# Patient Record
Sex: FEMALE | Race: WHITE | NOT HISPANIC OR LATINO | Employment: PART TIME | URBAN - METROPOLITAN AREA
[De-identification: names, ages, dates, MRNs, and addresses within clinical notes are randomized per-mention and may not be internally consistent; named-entity substitution may affect disease eponyms.]

---

## 2019-03-07 ENCOUNTER — TRANSCRIBE ORDERS (OUTPATIENT)
Dept: LAB | Facility: HOSPITAL | Age: 29
End: 2019-03-07

## 2019-03-07 ENCOUNTER — LAB (OUTPATIENT)
Dept: LAB | Facility: HOSPITAL | Age: 29
End: 2019-03-07

## 2019-03-07 DIAGNOSIS — Z34.83 PRENATAL CARE, SUBSEQUENT PREGNANCY, THIRD TRIMESTER: Primary | ICD-10-CM

## 2019-03-07 DIAGNOSIS — Z34.83 PRENATAL CARE, SUBSEQUENT PREGNANCY, THIRD TRIMESTER: ICD-10-CM

## 2019-03-07 PROCEDURE — 87081 CULTURE SCREEN ONLY: CPT

## 2019-03-10 LAB — BACTERIA SPEC AEROBE CULT: NORMAL

## 2019-03-21 ENCOUNTER — APPOINTMENT (OUTPATIENT)
Dept: PREADMISSION TESTING | Facility: HOSPITAL | Age: 29
End: 2019-03-21

## 2019-03-21 VITALS — HEIGHT: 67 IN | BODY MASS INDEX: 24.84 KG/M2 | WEIGHT: 158.29 LBS

## 2019-03-21 LAB
ABO GROUP BLD: NORMAL
BLD GP AB SCN SERPL QL: NEGATIVE
DEPRECATED RDW RBC AUTO: 39.4 FL (ref 37–54)
ERYTHROCYTE [DISTWIDTH] IN BLOOD BY AUTOMATED COUNT: 12.3 % (ref 11.3–14.5)
HCT VFR BLD AUTO: 34.2 % (ref 34.5–44)
HGB BLD-MCNC: 11.4 G/DL (ref 11.5–15.5)
MCH RBC QN AUTO: 29.7 PG (ref 27–31)
MCHC RBC AUTO-ENTMCNC: 33.3 G/DL (ref 32–36)
MCV RBC AUTO: 89.1 FL (ref 80–99)
PLATELET # BLD AUTO: 229 10*3/MM3 (ref 150–450)
PMV BLD AUTO: 10.5 FL (ref 6–12)
RBC # BLD AUTO: 3.84 10*6/MM3 (ref 3.89–5.14)
RH BLD: POSITIVE
T&S EXPIRATION DATE: NORMAL
WBC NRBC COR # BLD: 7.69 10*3/MM3 (ref 3.5–10.8)

## 2019-03-21 PROCEDURE — 86850 RBC ANTIBODY SCREEN: CPT | Performed by: OBSTETRICS & GYNECOLOGY

## 2019-03-21 PROCEDURE — 36415 COLL VENOUS BLD VENIPUNCTURE: CPT

## 2019-03-21 PROCEDURE — 86900 BLOOD TYPING SEROLOGIC ABO: CPT | Performed by: OBSTETRICS & GYNECOLOGY

## 2019-03-21 PROCEDURE — 85027 COMPLETE CBC AUTOMATED: CPT | Performed by: OBSTETRICS & GYNECOLOGY

## 2019-03-21 PROCEDURE — 86901 BLOOD TYPING SEROLOGIC RH(D): CPT | Performed by: OBSTETRICS & GYNECOLOGY

## 2019-03-21 RX ORDER — DIPHENHYDRAMINE HCL 25 MG
25 CAPSULE ORAL NIGHTLY PRN
COMMUNITY
End: 2019-03-25 | Stop reason: HOSPADM

## 2019-03-21 RX ORDER — PRENATAL WITH FERROUS FUM AND FOLIC ACID 3080; 920; 120; 400; 22; 1.84; 3; 20; 10; 1; 12; 200; 27; 25; 2 [IU]/1; [IU]/1; MG/1; [IU]/1; MG/1; MG/1; MG/1; MG/1; MG/1; MG/1; UG/1; MG/1; MG/1; MG/1; MG/1
1 TABLET ORAL DAILY
COMMUNITY

## 2019-03-22 ENCOUNTER — ANESTHESIA EVENT (OUTPATIENT)
Dept: LABOR AND DELIVERY | Facility: HOSPITAL | Age: 29
End: 2019-03-22

## 2019-03-22 ENCOUNTER — ANESTHESIA (OUTPATIENT)
Dept: LABOR AND DELIVERY | Facility: HOSPITAL | Age: 29
End: 2019-03-22

## 2019-03-22 ENCOUNTER — HOSPITAL ENCOUNTER (INPATIENT)
Facility: HOSPITAL | Age: 29
LOS: 3 days | Discharge: HOME | End: 2019-03-25
Attending: OBSTETRICS & GYNECOLOGY | Admitting: OBSTETRICS & GYNECOLOGY

## 2019-03-22 LAB
ABO GROUP BLD: NORMAL
ARTERIAL PATENCY WRIST A: ABNORMAL
ATMOSPHERIC PRESS: ABNORMAL MMHG
ATMOSPHERIC PRESS: ABNORMAL MMHG
BASE EXCESS BLDA CALC-SCNC: -1.6 MMOL/L (ref 0–2)
BASE EXCESS BLDCOV CALC-SCNC: -1.3 MMOL/L (ref 0–2)
BDY SITE: ABNORMAL
BODY TEMPERATURE: 37 C
BODY TEMPERATURE: 37 C
CO2 BLDA-SCNC: 25.1 MMOL/L (ref 23–27)
CO2 BLDA-SCNC: 27.2 MMOL/L (ref 23–27)
COHGB MFR BLD: 1.6 % (ref 0–2)
GLUCOSE BLDC GLUCOMTR-MCNC: 72 MG/DL (ref 70–130)
HCO3 BLDA-SCNC: 25.6 MMOL/L (ref 20–26)
HCO3 BLDCOV-SCNC: 23.8 MMOL/L (ref 18.6–21.4)
HCT VFR BLD CALC: 47 %
HGB BLDA-MCNC: 15.3 G/DL (ref 14–18)
HGB BLDA-MCNC: 15.8 G/DL (ref 14–18)
HOROWITZ INDEX BLD+IHG-RTO: 21 %
HOROWITZ INDEX BLD+IHG-RTO: 21 %
METHGB BLD QL: 1.7 % (ref 0–1.5)
MODALITY: ABNORMAL
MODALITY: ABNORMAL
NOTE: ABNORMAL
NOTE: ABNORMAL
OXYHGB MFR BLDV: 29.2 % (ref 94–99)
PCO2 BLDA: 52 MM HG (ref 35–45)
PCO2 BLDCOV: 40.6 MM HG
PCO2 TEMP ADJ BLD: 52 MM HG (ref 35–45)
PH BLDA: 7.3 PH UNITS (ref 7.35–7.45)
PH BLDCOV: 7.38 PH UNITS
PH, TEMP CORRECTED: 7.3 PH UNITS
PO2 BLDA: 15.1 MM HG (ref 83–108)
PO2 BLDCOV: 28.6 MM HG
PO2 TEMP ADJ BLD: 15.1 MM HG (ref 83–108)
RH BLD: POSITIVE
SAO2 % BLDCOA: ABNORMAL % (ref 92–98)
VENTILATOR MODE: ABNORMAL
VENTILATOR MODE: ABNORMAL

## 2019-03-22 PROCEDURE — 86900 BLOOD TYPING SEROLOGIC ABO: CPT

## 2019-03-22 PROCEDURE — 25010000003 MORPHINE PER 10 MG: Performed by: NURSE ANESTHETIST, CERTIFIED REGISTERED

## 2019-03-22 PROCEDURE — 25010000002 FENTANYL CITRATE (PF) 100 MCG/2ML SOLUTION: Performed by: NURSE ANESTHETIST, CERTIFIED REGISTERED

## 2019-03-22 PROCEDURE — 82962 GLUCOSE BLOOD TEST: CPT

## 2019-03-22 PROCEDURE — 25010000002 ONDANSETRON PER 1 MG: Performed by: NURSE ANESTHETIST, CERTIFIED REGISTERED

## 2019-03-22 PROCEDURE — 36600 WITHDRAWAL OF ARTERIAL BLOOD: CPT

## 2019-03-22 PROCEDURE — 59025 FETAL NON-STRESS TEST: CPT

## 2019-03-22 PROCEDURE — 25010000002 PHENYLEPHRINE PER 1 ML: Performed by: NURSE ANESTHETIST, CERTIFIED REGISTERED

## 2019-03-22 PROCEDURE — 25010000003 CEFAZOLIN IN DEXTROSE 2-4 GM/100ML-% SOLUTION: Performed by: OBSTETRICS & GYNECOLOGY

## 2019-03-22 PROCEDURE — 94799 UNLISTED PULMONARY SVC/PX: CPT

## 2019-03-22 PROCEDURE — 25010000002 MIDAZOLAM PER 1 MG: Performed by: NURSE ANESTHETIST, CERTIFIED REGISTERED

## 2019-03-22 PROCEDURE — 25010000002 KETOROLAC TROMETHAMINE PER 15 MG: Performed by: NURSE ANESTHETIST, CERTIFIED REGISTERED

## 2019-03-22 PROCEDURE — 86901 BLOOD TYPING SEROLOGIC RH(D): CPT

## 2019-03-22 PROCEDURE — 82805 BLOOD GASES W/O2 SATURATION: CPT

## 2019-03-22 RX ORDER — IBUPROFEN 600 MG/1
600 TABLET ORAL EVERY 6 HOURS PRN
Status: DISCONTINUED | OUTPATIENT
Start: 2019-03-22 | End: 2019-03-25 | Stop reason: HOSPADM

## 2019-03-22 RX ORDER — CARBOPROST TROMETHAMINE 250 UG/ML
250 INJECTION, SOLUTION INTRAMUSCULAR AS NEEDED
Status: DISCONTINUED | OUTPATIENT
Start: 2019-03-22 | End: 2019-03-25 | Stop reason: HOSPADM

## 2019-03-22 RX ORDER — TRISODIUM CITRATE DIHYDRATE AND CITRIC ACID MONOHYDRATE 500; 334 MG/5ML; MG/5ML
30 SOLUTION ORAL ONCE
Status: COMPLETED | OUTPATIENT
Start: 2019-03-22 | End: 2019-03-22

## 2019-03-22 RX ORDER — LIDOCAINE HYDROCHLORIDE 10 MG/ML
5 INJECTION, SOLUTION EPIDURAL; INFILTRATION; INTRACAUDAL; PERINEURAL AS NEEDED
Status: DISCONTINUED | OUTPATIENT
Start: 2019-03-22 | End: 2019-03-25 | Stop reason: HOSPADM

## 2019-03-22 RX ORDER — SODIUM CHLORIDE, SODIUM LACTATE, POTASSIUM CHLORIDE, CALCIUM CHLORIDE 600; 310; 30; 20 MG/100ML; MG/100ML; MG/100ML; MG/100ML
125 INJECTION, SOLUTION INTRAVENOUS CONTINUOUS
Status: DISCONTINUED | OUTPATIENT
Start: 2019-03-22 | End: 2019-03-25 | Stop reason: HOSPADM

## 2019-03-22 RX ORDER — OXYCODONE AND ACETAMINOPHEN 7.5; 325 MG/1; MG/1
1 TABLET ORAL EVERY 4 HOURS PRN
Status: COMPLETED | OUTPATIENT
Start: 2019-03-22 | End: 2019-03-22

## 2019-03-22 RX ORDER — MISOPROSTOL 200 UG/1
800 TABLET ORAL AS NEEDED
Status: DISCONTINUED | OUTPATIENT
Start: 2019-03-22 | End: 2019-03-25 | Stop reason: HOSPADM

## 2019-03-22 RX ORDER — METHYLERGONOVINE MALEATE 0.2 MG/ML
200 INJECTION INTRAVENOUS ONCE AS NEEDED
Status: DISCONTINUED | OUTPATIENT
Start: 2019-03-22 | End: 2019-03-25 | Stop reason: HOSPADM

## 2019-03-22 RX ORDER — MORPHINE SULFATE 0.5 MG/ML
INJECTION, SOLUTION EPIDURAL; INTRATHECAL; INTRAVENOUS AS NEEDED
Status: DISCONTINUED | OUTPATIENT
Start: 2019-03-22 | End: 2019-03-22 | Stop reason: SURG

## 2019-03-22 RX ORDER — OXYTOCIN-SODIUM CHLORIDE 0.9% IV SOLN 30 UNIT/500ML 30-0.9/5 UT/ML-%
85 SOLUTION INTRAVENOUS ONCE
Status: DISCONTINUED | OUTPATIENT
Start: 2019-03-22 | End: 2019-03-25 | Stop reason: HOSPADM

## 2019-03-22 RX ORDER — PROMETHAZINE HYDROCHLORIDE 25 MG/ML
12.5 INJECTION, SOLUTION INTRAMUSCULAR; INTRAVENOUS EVERY 6 HOURS PRN
Status: DISCONTINUED | OUTPATIENT
Start: 2019-03-22 | End: 2019-03-25 | Stop reason: HOSPADM

## 2019-03-22 RX ORDER — DIPHENHYDRAMINE HCL 25 MG
25 CAPSULE ORAL EVERY 4 HOURS PRN
Status: CANCELLED | OUTPATIENT
Start: 2019-03-22

## 2019-03-22 RX ORDER — ACETAMINOPHEN 325 MG/1
650 TABLET ORAL EVERY 4 HOURS PRN
Status: DISCONTINUED | OUTPATIENT
Start: 2019-03-22 | End: 2019-03-25 | Stop reason: HOSPADM

## 2019-03-22 RX ORDER — LANOLIN 100 %
OINTMENT (GRAM) TOPICAL
Status: DISCONTINUED | OUTPATIENT
Start: 2019-03-22 | End: 2019-03-25 | Stop reason: HOSPADM

## 2019-03-22 RX ORDER — FENTANYL CITRATE 50 UG/ML
INJECTION, SOLUTION INTRAMUSCULAR; INTRAVENOUS AS NEEDED
Status: DISCONTINUED | OUTPATIENT
Start: 2019-03-22 | End: 2019-03-22 | Stop reason: SURG

## 2019-03-22 RX ORDER — SIMETHICONE 80 MG
80 TABLET,CHEWABLE ORAL 4 TIMES DAILY PRN
Status: DISCONTINUED | OUTPATIENT
Start: 2019-03-22 | End: 2019-03-25 | Stop reason: HOSPADM

## 2019-03-22 RX ORDER — IBUPROFEN 600 MG/1
600 TABLET ORAL EVERY 6 HOURS PRN
Status: COMPLETED | OUTPATIENT
Start: 2019-03-22 | End: 2019-03-22

## 2019-03-22 RX ORDER — PROMETHAZINE HYDROCHLORIDE 25 MG/1
25 TABLET ORAL EVERY 6 HOURS PRN
Status: DISCONTINUED | OUTPATIENT
Start: 2019-03-22 | End: 2019-03-25 | Stop reason: HOSPADM

## 2019-03-22 RX ORDER — NALOXONE HCL 0.4 MG/ML
0.4 VIAL (ML) INJECTION
Status: DISCONTINUED | OUTPATIENT
Start: 2019-03-22 | End: 2019-03-25 | Stop reason: HOSPADM

## 2019-03-22 RX ORDER — SODIUM CHLORIDE 0.9 % (FLUSH) 0.9 %
3 SYRINGE (ML) INJECTION EVERY 12 HOURS SCHEDULED
Status: DISCONTINUED | OUTPATIENT
Start: 2019-03-22 | End: 2019-03-23

## 2019-03-22 RX ORDER — ONDANSETRON 2 MG/ML
4 INJECTION INTRAMUSCULAR; INTRAVENOUS ONCE AS NEEDED
Status: COMPLETED | OUTPATIENT
Start: 2019-03-22 | End: 2019-03-22

## 2019-03-22 RX ORDER — CARBOPROST TROMETHAMINE 250 UG/ML
250 INJECTION, SOLUTION INTRAMUSCULAR ONCE
Status: DISCONTINUED | OUTPATIENT
Start: 2019-03-22 | End: 2019-03-22

## 2019-03-22 RX ORDER — MIDAZOLAM HYDROCHLORIDE 1 MG/ML
INJECTION INTRAMUSCULAR; INTRAVENOUS AS NEEDED
Status: DISCONTINUED | OUTPATIENT
Start: 2019-03-22 | End: 2019-03-22 | Stop reason: SURG

## 2019-03-22 RX ORDER — SODIUM CHLORIDE 0.9 % (FLUSH) 0.9 %
3-10 SYRINGE (ML) INJECTION AS NEEDED
Status: DISCONTINUED | OUTPATIENT
Start: 2019-03-22 | End: 2019-03-23

## 2019-03-22 RX ORDER — PROMETHAZINE HYDROCHLORIDE 12.5 MG/1
12.5 SUPPOSITORY RECTAL EVERY 6 HOURS PRN
Status: DISCONTINUED | OUTPATIENT
Start: 2019-03-22 | End: 2019-03-25 | Stop reason: HOSPADM

## 2019-03-22 RX ORDER — OXYCODONE HYDROCHLORIDE AND ACETAMINOPHEN 5; 325 MG/1; MG/1
2 TABLET ORAL EVERY 4 HOURS PRN
Status: DISCONTINUED | OUTPATIENT
Start: 2019-03-22 | End: 2019-03-25 | Stop reason: HOSPADM

## 2019-03-22 RX ORDER — NALOXONE HCL 0.4 MG/ML
0.4 VIAL (ML) INJECTION
Status: CANCELLED | OUTPATIENT
Start: 2019-03-22 | End: 2019-03-23

## 2019-03-22 RX ORDER — METHYLERGONOVINE MALEATE 0.2 MG/ML
200 INJECTION INTRAVENOUS ONCE
Status: DISCONTINUED | OUTPATIENT
Start: 2019-03-22 | End: 2019-03-22

## 2019-03-22 RX ORDER — OXYTOCIN-SODIUM CHLORIDE 0.9% IV SOLN 30 UNIT/500ML 30-0.9/5 UT/ML-%
650 SOLUTION INTRAVENOUS ONCE
Status: DISCONTINUED | OUTPATIENT
Start: 2019-03-22 | End: 2019-03-25 | Stop reason: HOSPADM

## 2019-03-22 RX ORDER — MORPHINE SULFATE 2 MG/ML
2 INJECTION, SOLUTION INTRAMUSCULAR; INTRAVENOUS EVERY 4 HOURS PRN
Status: DISCONTINUED | OUTPATIENT
Start: 2019-03-22 | End: 2019-03-25 | Stop reason: HOSPADM

## 2019-03-22 RX ORDER — DOCUSATE SODIUM 100 MG/1
100 CAPSULE, LIQUID FILLED ORAL 2 TIMES DAILY PRN
Status: DISCONTINUED | OUTPATIENT
Start: 2019-03-22 | End: 2019-03-25 | Stop reason: HOSPADM

## 2019-03-22 RX ORDER — CEFAZOLIN SODIUM 2 G/100ML
2 INJECTION, SOLUTION INTRAVENOUS ONCE
Status: COMPLETED | OUTPATIENT
Start: 2019-03-22 | End: 2019-03-22

## 2019-03-22 RX ORDER — DIPHENHYDRAMINE HYDROCHLORIDE 50 MG/ML
25 INJECTION INTRAMUSCULAR; INTRAVENOUS EVERY 4 HOURS PRN
Status: CANCELLED | OUTPATIENT
Start: 2019-03-22

## 2019-03-22 RX ORDER — ONDANSETRON 2 MG/ML
INJECTION INTRAMUSCULAR; INTRAVENOUS AS NEEDED
Status: DISCONTINUED | OUTPATIENT
Start: 2019-03-22 | End: 2019-03-22 | Stop reason: SURG

## 2019-03-22 RX ORDER — OXYTOCIN 10 [USP'U]/ML
INJECTION, SOLUTION INTRAMUSCULAR; INTRAVENOUS AS NEEDED
Status: DISCONTINUED | OUTPATIENT
Start: 2019-03-22 | End: 2019-03-22 | Stop reason: SURG

## 2019-03-22 RX ORDER — KETOROLAC TROMETHAMINE 30 MG/ML
30 INJECTION, SOLUTION INTRAMUSCULAR; INTRAVENOUS EVERY 6 HOURS
Status: COMPLETED | OUTPATIENT
Start: 2019-03-22 | End: 2019-03-22

## 2019-03-22 RX ORDER — BUPIVACAINE HYDROCHLORIDE 7.5 MG/ML
INJECTION, SOLUTION EPIDURAL; RETROBULBAR AS NEEDED
Status: DISCONTINUED | OUTPATIENT
Start: 2019-03-22 | End: 2019-03-22 | Stop reason: SURG

## 2019-03-22 RX ADMIN — MIDAZOLAM HYDROCHLORIDE 1 MG: 1 INJECTION, SOLUTION INTRAMUSCULAR; INTRAVENOUS at 07:49

## 2019-03-22 RX ADMIN — FENTANYL CITRATE 15 MCG: 50 INJECTION, SOLUTION INTRAMUSCULAR; INTRAVENOUS at 07:54

## 2019-03-22 RX ADMIN — POLYETHYLENE GLYCOL 3350 17 G: 17 POWDER, FOR SOLUTION ORAL at 17:12

## 2019-03-22 RX ADMIN — CEFAZOLIN SODIUM 2 G: 2 INJECTION, SOLUTION INTRAVENOUS at 07:41

## 2019-03-22 RX ADMIN — ONDANSETRON 4 MG: 2 INJECTION INTRAMUSCULAR; INTRAVENOUS at 07:57

## 2019-03-22 RX ADMIN — SODIUM CITRATE AND CITRIC ACID MONOHYDRATE 30 ML: 500; 334 SOLUTION ORAL at 07:35

## 2019-03-22 RX ADMIN — OXYTOCIN 20 UNITS: 10 INJECTION, SOLUTION INTRAMUSCULAR; INTRAVENOUS at 08:31

## 2019-03-22 RX ADMIN — OXYCODONE HYDROCHLORIDE AND ACETAMINOPHEN 1 TABLET: 7.5; 325 TABLET ORAL at 11:54

## 2019-03-22 RX ADMIN — IBUPROFEN 600 MG: 600 TABLET, FILM COATED ORAL at 17:19

## 2019-03-22 RX ADMIN — KETOROLAC TROMETHAMINE 30 MG: 30 INJECTION, SOLUTION INTRAMUSCULAR at 10:26

## 2019-03-22 RX ADMIN — MORPHINE SULFATE 0.15 MG: 0.5 INJECTION, SOLUTION EPIDURAL; INTRATHECAL; INTRAVENOUS at 07:54

## 2019-03-22 RX ADMIN — SIMETHICONE CHEW TAB 80 MG 80 MG: 80 TABLET ORAL at 23:07

## 2019-03-22 RX ADMIN — IBUPROFEN 600 MG: 600 TABLET, FILM COATED ORAL at 22:15

## 2019-03-22 RX ADMIN — BUPIVACAINE HYDROCHLORIDE 1.5 ML: 7.5 INJECTION, SOLUTION EPIDURAL; RETROBULBAR at 07:54

## 2019-03-22 RX ADMIN — PHENYLEPHRINE HYDROCHLORIDE 50 MCG: 10 INJECTION INTRAVENOUS at 08:00

## 2019-03-22 RX ADMIN — FENTANYL CITRATE 85 MCG: 50 INJECTION, SOLUTION INTRAMUSCULAR; INTRAVENOUS at 08:15

## 2019-03-22 RX ADMIN — OXYTOCIN 30 UNITS: 10 INJECTION, SOLUTION INTRAMUSCULAR; INTRAVENOUS at 08:12

## 2019-03-22 RX ADMIN — SODIUM CHLORIDE, POTASSIUM CHLORIDE, SODIUM LACTATE AND CALCIUM CHLORIDE 1000 ML: 600; 310; 30; 20 INJECTION, SOLUTION INTRAVENOUS at 07:28

## 2019-03-22 RX ADMIN — OXYCODONE AND ACETAMINOPHEN 2 TABLET: 5; 325 TABLET ORAL at 22:16

## 2019-03-22 RX ADMIN — SIMETHICONE CHEW TAB 80 MG 80 MG: 80 TABLET ORAL at 18:01

## 2019-03-22 RX ADMIN — MIDAZOLAM HYDROCHLORIDE 1 MG: 1 INJECTION, SOLUTION INTRAMUSCULAR; INTRAVENOUS at 08:24

## 2019-03-22 RX ADMIN — OXYCODONE AND ACETAMINOPHEN 2 TABLET: 5; 325 TABLET ORAL at 18:08

## 2019-03-22 RX ADMIN — SODIUM CHLORIDE, POTASSIUM CHLORIDE, SODIUM LACTATE AND CALCIUM CHLORIDE 1000 ML/HR: 600; 310; 30; 20 INJECTION, SOLUTION INTRAVENOUS at 07:30

## 2019-03-22 RX ADMIN — SODIUM CHLORIDE, POTASSIUM CHLORIDE, SODIUM LACTATE AND CALCIUM CHLORIDE: 600; 310; 30; 20 INJECTION, SOLUTION INTRAVENOUS at 08:31

## 2019-03-22 RX ADMIN — SODIUM CHLORIDE, POTASSIUM CHLORIDE, SODIUM LACTATE AND CALCIUM CHLORIDE: 600; 310; 30; 20 INJECTION, SOLUTION INTRAVENOUS at 08:03

## 2019-03-22 RX ADMIN — ONDANSETRON 4 MG: 2 INJECTION INTRAMUSCULAR; INTRAVENOUS at 13:19

## 2019-03-22 NOTE — ANESTHESIA PREPROCEDURE EVALUATION
Anesthesia Evaluation     Patient summary reviewed and Nursing notes reviewed   NPO Solid Status: > 8 hours  NPO Liquid Status: > 8 hours           Airway   Mallampati: II  TM distance: >3 FB  Neck ROM: full  Dental      Pulmonary - negative pulmonary ROS   Cardiovascular - negative cardio ROS        Neuro/Psych- negative ROS  GI/Hepatic/Renal/Endo - negative ROS     Musculoskeletal (-) negative ROS    Abdominal    Substance History - negative use     OB/GYN    (+) Pregnant,         Other                        Anesthesia Plan    ASA 2     spinal and ITN     Anesthetic plan, all risks, benefits, and alternatives have been provided, discussed and informed consent has been obtained with: patient.

## 2019-03-22 NOTE — ANESTHESIA POSTPROCEDURE EVALUATION
Patient: Suzanne Wade    Procedure Summary     Date:  19 Room / Location:  [facility] LABOR DELIVERY   [facility]    Anesthesia Start:  746 Anesthesia Stop:  847    Procedure:   SECTION PRIMARY (N/A Abdomen) Diagnosis:      Surgeon:  Macey Hayden MD Provider:  Kei Lang DO    Anesthesia Type:  spinal, ITN ASA Status:  2          Anesthesia Type: spinal, ITN  Last vitals  BP   107/64 (19 0845)   Temp   97.8 °F (36.6 °C) (19)   Pulse   68 (19)   Resp   18 (19)     SpO2   97 % (19)     Post Anesthesia Care and Evaluation    Patient location during evaluation: bedside  Patient participation: complete - patient participated  Level of consciousness: awake and alert  Pain score: 0  Pain management: adequate  Airway patency: patent  Anesthetic complications: No anesthetic complications    Cardiovascular status: acceptable  Respiratory status: acceptable  Hydration status: acceptable

## 2019-03-22 NOTE — ANESTHESIA PROCEDURE NOTES
Spinal Block      Indication:procedure for pain  Performed By  Anesthesiologist: Kei Lang DO  CRNA: Liliane Licona CRNA  Preanesthetic Checklist  Completed: patient identified, surgical consent, pre-op evaluation, timeout performed, IV checked, risks and benefits discussed and monitors and equipment checked  Spinal Block Prep:  Patient Position:sitting  Sterile Tech:cap, gloves, mask and sterile barriers  Prep:Betadine  Patient Monitoring:blood pressure monitoring, continuous pulse oximetry and EKG  Spinal Block Procedure    Fluid Appearance:clear     Post Assessment  Patient Tolerance:patient tolerated the procedure well with no apparent complications  Complications no

## 2019-03-23 LAB
BASOPHILS # BLD AUTO: 0.02 10*3/MM3 (ref 0–0.2)
BASOPHILS NFR BLD AUTO: 0.3 % (ref 0–1)
DEPRECATED RDW RBC AUTO: 39.5 FL (ref 37–54)
EOSINOPHIL # BLD AUTO: 0.06 10*3/MM3 (ref 0–0.3)
EOSINOPHIL NFR BLD AUTO: 0.8 % (ref 0–3)
ERYTHROCYTE [DISTWIDTH] IN BLOOD BY AUTOMATED COUNT: 12.2 % (ref 11.3–14.5)
HCT VFR BLD AUTO: 27.6 % (ref 34.5–44)
HGB BLD-MCNC: 9.2 G/DL (ref 11.5–15.5)
IMM GRANULOCYTES # BLD AUTO: 0.01 10*3/MM3 (ref 0–0.05)
IMM GRANULOCYTES NFR BLD AUTO: 0.1 % (ref 0–0.6)
LYMPHOCYTES # BLD AUTO: 1.21 10*3/MM3 (ref 0.6–4.8)
LYMPHOCYTES NFR BLD AUTO: 15.2 % (ref 24–44)
MCH RBC QN AUTO: 29.6 PG (ref 27–31)
MCHC RBC AUTO-ENTMCNC: 33.3 G/DL (ref 32–36)
MCV RBC AUTO: 88.7 FL (ref 80–99)
MONOCYTES # BLD AUTO: 0.65 10*3/MM3 (ref 0–1)
MONOCYTES NFR BLD AUTO: 8.1 % (ref 0–12)
NEUTROPHILS # BLD AUTO: 6.04 10*3/MM3 (ref 1.5–8.3)
NEUTROPHILS NFR BLD AUTO: 75.6 % (ref 41–71)
PLATELET # BLD AUTO: 178 10*3/MM3 (ref 150–450)
PMV BLD AUTO: 10.5 FL (ref 6–12)
RBC # BLD AUTO: 3.11 10*6/MM3 (ref 3.89–5.14)
WBC NRBC COR # BLD: 7.98 10*3/MM3 (ref 3.5–10.8)

## 2019-03-23 PROCEDURE — 85025 COMPLETE CBC W/AUTO DIFF WBC: CPT | Performed by: OBSTETRICS & GYNECOLOGY

## 2019-03-23 RX ADMIN — IBUPROFEN 600 MG: 600 TABLET, FILM COATED ORAL at 19:15

## 2019-03-23 RX ADMIN — OXYCODONE AND ACETAMINOPHEN 2 TABLET: 5; 325 TABLET ORAL at 12:20

## 2019-03-23 RX ADMIN — SIMETHICONE CHEW TAB 80 MG 80 MG: 80 TABLET ORAL at 07:42

## 2019-03-23 RX ADMIN — IBUPROFEN 600 MG: 600 TABLET, FILM COATED ORAL at 06:09

## 2019-03-23 RX ADMIN — OXYCODONE AND ACETAMINOPHEN 2 TABLET: 5; 325 TABLET ORAL at 06:09

## 2019-03-23 RX ADMIN — DOCUSATE SODIUM 100 MG: 100 CAPSULE, LIQUID FILLED ORAL at 07:42

## 2019-03-23 RX ADMIN — IBUPROFEN 600 MG: 600 TABLET, FILM COATED ORAL at 12:21

## 2019-03-23 RX ADMIN — OXYCODONE AND ACETAMINOPHEN 2 TABLET: 5; 325 TABLET ORAL at 19:15

## 2019-03-23 NOTE — ANESTHESIA POSTPROCEDURE EVALUATION
Patient: Suzanne Wade    Procedure Summary     Date:  19 Room / Location:  [facility] LABOR DELIVERY   [facility]    Anesthesia Start:  746 Anesthesia Stop:  847    Procedure:   SECTION PRIMARY (N/A Abdomen) Diagnosis:      Surgeon:  Macey Hayden MD Provider:  Kei Lang DO    Anesthesia Type:  spinal, ITN ASA Status:  2          Anesthesia Type: spinal, ITN  Last vitals  BP   113/67 (19 0710)   Temp   97.6 °F (36.4 °C) (19 0710)   Pulse   50 (19 0710)   Resp   16 (19 0710)     SpO2   100 % (19 1031)     Post Anesthesia Care and Evaluation    Patient location during evaluation: bedside  Patient participation: complete - patient participated  Level of consciousness: awake and awake and alert  Pain score: 0  Pain management: satisfactory to patient  Airway patency: patent  Anesthetic complications: No anesthetic complications  PONV Status: none  Cardiovascular status: acceptable  Respiratory status: acceptable  Hydration status: acceptable  Post Neuraxial Block status: Motor and sensory function returned to baseline and No signs or symptoms of PDPH

## 2019-03-24 RX ADMIN — SIMETHICONE CHEW TAB 80 MG 80 MG: 80 TABLET ORAL at 08:09

## 2019-03-24 RX ADMIN — DOCUSATE SODIUM 100 MG: 100 CAPSULE, LIQUID FILLED ORAL at 08:09

## 2019-03-24 RX ADMIN — OXYCODONE AND ACETAMINOPHEN 2 TABLET: 5; 325 TABLET ORAL at 10:23

## 2019-03-24 RX ADMIN — IBUPROFEN 600 MG: 600 TABLET, FILM COATED ORAL at 16:38

## 2019-03-24 RX ADMIN — IBUPROFEN 600 MG: 600 TABLET, FILM COATED ORAL at 10:23

## 2019-03-24 RX ADMIN — OXYCODONE AND ACETAMINOPHEN 2 TABLET: 5; 325 TABLET ORAL at 16:38

## 2019-03-24 RX ADMIN — SIMETHICONE CHEW TAB 80 MG 80 MG: 80 TABLET ORAL at 16:38

## 2019-03-24 RX ADMIN — OXYCODONE AND ACETAMINOPHEN 2 TABLET: 5; 325 TABLET ORAL at 04:22

## 2019-03-24 RX ADMIN — IBUPROFEN 600 MG: 600 TABLET, FILM COATED ORAL at 04:22

## 2019-03-25 VITALS
HEART RATE: 62 BPM | OXYGEN SATURATION: 100 % | BODY MASS INDEX: 24.64 KG/M2 | DIASTOLIC BLOOD PRESSURE: 76 MMHG | TEMPERATURE: 98.4 F | HEIGHT: 67 IN | WEIGHT: 157 LBS | SYSTOLIC BLOOD PRESSURE: 127 MMHG | RESPIRATION RATE: 16 BRPM

## 2019-03-25 RX ORDER — IBUPROFEN 600 MG/1
600 TABLET ORAL EVERY 6 HOURS PRN
Qty: 30 TABLET | Refills: 0 | Status: SHIPPED | OUTPATIENT
Start: 2019-03-25 | End: 2022-06-14

## 2019-03-25 RX ORDER — PSEUDOEPHEDRINE HCL 30 MG
100 TABLET ORAL 2 TIMES DAILY PRN
Start: 2019-03-25 | End: 2022-07-12

## 2019-03-25 RX ORDER — OXYCODONE HYDROCHLORIDE AND ACETAMINOPHEN 5; 325 MG/1; MG/1
1-2 TABLET ORAL EVERY 6 HOURS PRN
Qty: 15 TABLET | Refills: 0 | Status: SHIPPED | OUTPATIENT
Start: 2019-03-25 | End: 2019-03-28

## 2019-03-25 RX ADMIN — IBUPROFEN 600 MG: 600 TABLET, FILM COATED ORAL at 09:10

## 2019-03-25 RX ADMIN — OXYCODONE AND ACETAMINOPHEN 1 TABLET: 5; 325 TABLET ORAL at 09:10

## 2019-03-25 RX ADMIN — OXYCODONE AND ACETAMINOPHEN 1 TABLET: 5; 325 TABLET ORAL at 00:41

## 2019-03-25 RX ADMIN — POLYETHYLENE GLYCOL 3350 17 G: 17 POWDER, FOR SOLUTION ORAL at 09:10

## 2019-03-25 RX ADMIN — OXYCODONE AND ACETAMINOPHEN 1 TABLET: 5; 325 TABLET ORAL at 14:22

## 2019-03-25 RX ADMIN — SIMETHICONE CHEW TAB 80 MG 80 MG: 80 TABLET ORAL at 09:10

## 2019-03-25 RX ADMIN — IBUPROFEN 600 MG: 600 TABLET, FILM COATED ORAL at 00:41

## 2019-03-25 RX ADMIN — DOCUSATE SODIUM 100 MG: 100 CAPSULE, LIQUID FILLED ORAL at 09:10

## 2022-05-03 ENCOUNTER — OFFICE VISIT (OUTPATIENT)
Dept: OBSTETRICS AND GYNECOLOGY | Age: 32
End: 2022-05-03

## 2022-05-03 VITALS
WEIGHT: 132 LBS | DIASTOLIC BLOOD PRESSURE: 70 MMHG | HEIGHT: 67 IN | SYSTOLIC BLOOD PRESSURE: 120 MMHG | BODY MASS INDEX: 20.72 KG/M2

## 2022-05-03 DIAGNOSIS — N92.6 MISSED MENSES: Primary | ICD-10-CM

## 2022-05-03 LAB
B-HCG UR QL: POSITIVE
EXPIRATION DATE: ABNORMAL
INTERNAL NEGATIVE CONTROL: ABNORMAL
INTERNAL POSITIVE CONTROL: ABNORMAL
Lab: ABNORMAL

## 2022-05-03 PROCEDURE — 99202 OFFICE O/P NEW SF 15 MIN: CPT | Performed by: OBSTETRICS & GYNECOLOGY

## 2022-05-03 PROCEDURE — 81025 URINE PREGNANCY TEST: CPT | Performed by: OBSTETRICS & GYNECOLOGY

## 2022-05-03 RX ORDER — MELATONIN
1000 DAILY
COMMUNITY

## 2022-05-03 NOTE — PROGRESS NOTES
Subjective       History of Present Illness  Suzanne Wade is a 32 y.o. female is being seen today for missed menses    Patient was originally scheduled for discussion of conception concerns but recently had a positive pregnancy test    She has previously had a  for breech presentation in [facility] in  that pregnancy had no other complications and she did well postoperatively  She had an early miscarriage around 6 weeks last year.  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    No significant family history for genetic issues.    Her last menstrual period of  was pretty regular  Chief Complaint   Patient presents with   • Gynecologic Exam     New gyn: previously trying for pregnancy x 1 yr.Positive pregnancy test 22,Hx: 2021   .        The following portions of the patient's history were reviewed and updated as appropriate: allergies, current medications, past family history, past medical history, past social history, past surgical history and problem list.    PAST MEDICAL HISTORY  No past medical history on file.  OB History    Para Term  AB Living   3 1 1   1 1   SAB IAB Ectopic Molar Multiple Live Births   1       0 1      # Outcome Date GA Lbr Gabe/2nd Weight Sex Delivery Anes PTL Lv   3 Current            2 SAB     U SAB   FD   1 Term 19 39w0d  3145 g (6 lb 14.9 oz) F CS-LTranv Spinal N RAYMUNDO     Past Surgical History:   Procedure Laterality Date   •  SECTION N/A 3/22/2019    Procedure:  SECTION PRIMARY;  Surgeon: Macey Hayden MD;  Location: [facility] LABOR DELIVERY;  Service: Obstetrics/Gynecology   • SKIN SURGERY      birth escobar removed    • WISDOM TOOTH EXTRACTION       No family history on file.  Social History     Tobacco Use   Smoking Status Never Smoker   Smokeless Tobacco Never Used       Current Outpatient  Medications:   •  cholecalciferol (VITAMIN D3) 25 MCG (1000 UT) tablet, Take 1,000 Units by mouth Daily., Disp: , Rfl:   •  ibuprofen (ADVIL,MOTRIN) 600 MG tablet, Take 1 tablet by mouth Every 6 (Six) Hours As Needed for Mild Pain ., Disp: 30 tablet, Rfl: 0  •  Prenatal Vit-Fe Fumarate-FA (PRENATAL 27-) 27-1 MG tablet tablet, Take 1 tablet by mouth Daily., Disp: , Rfl:   •  docusate sodium 100 MG capsule, Take 100 mg by mouth 2 (Two) Times a Day As Needed for Constipation., Disp: , Rfl:   •  meclizine (ANTIVERT) 25 MG tablet, 1 pill TID prn dizziness, Disp: 21 tablet, Rfl: 0  There is no immunization history for the selected administration types on file for this patient.    Review of Systems       Except as outlined in history of physical illness, patient denies any changes in her GYN, , GI systems. All other systems reviewed are negative.    Objective   Physical Exam   Alert and oriented, respirations unlabored, heart regular rate and rhythm   Pelvic external genitalia normal vagina clean dry intact cervix is nulliparous in appearance uterus is enlarged 5 and half to 6 weeks size slightly retroflexed adnexa nonenlarged non tender  Heart regular rate and rhythm  Lungs are clear  Breast are even and symmetrical no masses or discharge        Assessment/Plan   Diagnoses and all orders for this visit:    1. Missed menses (Primary)  -     POC Pregnancy, Urine  -     OB Panel With HIV  -     Progesterone  -     TSH  -     HCG, B-subunit, Quantitative  -     IGP, Apt HPV,rfx 16 / 18,45      History of previous     History of SAB at 6 weeks as outlined above    Handheld ultrasound could not see a gestational sac, will check new OB lab work including TSH hCG and progesterone  Return in 1 to 2 weeks for ultrasound for confirmation of pregnancy  Risk of miscarriage reviewed continue prenatal vitamins new OB information given to patient           Orders Placed This Encounter   Procedures   • OB Panel With HIV      Order Specific Question:   Release to patient     Answer:   Immediate   • Progesterone     Order Specific Question:   Release to patient     Answer:   Immediate   • TSH     Order Specific Question:   Release to patient     Answer:   Immediate   • HCG, B-subunit, Quantitative     Order Specific Question:   Release to patient     Answer:   Immediate   • POC Pregnancy, Urine     Order Specific Question:   Release to patient     Answer:   Immediate           EMR Dragon/ Transcription disclaimer:  Much of the encounter note is an electronic transcription/translation of spoken language to printed text. The electronic translation of spoken language may permit erroneous, or at times, nonessential words or phrases to be inadvertently transcribes; Although i have reviewed the note for such errors, some may still exist.

## 2022-05-04 ENCOUNTER — TELEPHONE (OUTPATIENT)
Dept: OBSTETRICS AND GYNECOLOGY | Age: 32
End: 2022-05-04

## 2022-05-04 LAB
ABO GROUP BLD: NORMAL
BASOPHILS # BLD AUTO: 0.1 X10E3/UL (ref 0–0.2)
BASOPHILS NFR BLD AUTO: 1 %
BLD GP AB SCN SERPL QL: NEGATIVE
EOSINOPHIL # BLD AUTO: 0.4 X10E3/UL (ref 0–0.4)
EOSINOPHIL NFR BLD AUTO: 6 %
ERYTHROCYTE [DISTWIDTH] IN BLOOD BY AUTOMATED COUNT: 11.9 % (ref 11.7–15.4)
HBV SURFACE AG SERPL QL IA: NEGATIVE
HCG INTACT+B SERPL-ACNC: NORMAL MIU/ML
HCT VFR BLD AUTO: 38.7 % (ref 34–46.6)
HCV AB S/CO SERPL IA: <0.1 S/CO RATIO (ref 0–0.9)
HGB BLD-MCNC: 12.7 G/DL (ref 11.1–15.9)
HIV 1+2 AB+HIV1 P24 AG SERPL QL IA: NON REACTIVE
IMM GRANULOCYTES # BLD AUTO: 0 X10E3/UL (ref 0–0.1)
IMM GRANULOCYTES NFR BLD AUTO: 0 %
LYMPHOCYTES # BLD AUTO: 1.5 X10E3/UL (ref 0.7–3.1)
LYMPHOCYTES NFR BLD AUTO: 19 %
MCH RBC QN AUTO: 29.7 PG (ref 26.6–33)
MCHC RBC AUTO-ENTMCNC: 32.8 G/DL (ref 31.5–35.7)
MCV RBC AUTO: 91 FL (ref 79–97)
MONOCYTES # BLD AUTO: 0.4 X10E3/UL (ref 0.1–0.9)
MONOCYTES NFR BLD AUTO: 6 %
NEUTROPHILS # BLD AUTO: 5.5 X10E3/UL (ref 1.4–7)
NEUTROPHILS NFR BLD AUTO: 68 %
PLATELET # BLD AUTO: 267 X10E3/UL (ref 150–450)
PROGEST SERPL-MCNC: 32.6 NG/ML
RBC # BLD AUTO: 4.27 X10E6/UL (ref 3.77–5.28)
RH BLD: POSITIVE
RPR SER QL: NON REACTIVE
RUBV IGG SERPL IA-ACNC: 1.07 INDEX
TSH SERPL DL<=0.005 MIU/L-ACNC: 2.26 UIU/ML (ref 0.45–4.5)
WBC # BLD AUTO: 8 X10E3/UL (ref 3.4–10.8)

## 2022-05-04 NOTE — TELEPHONE ENCOUNTER
----- Message from Jhon Palacios MD sent at 5/4/2022  9:23 AM EDT -----   Please notify pt. That labs are with in normal limits, hormone levels look excellent.

## 2022-05-05 ENCOUNTER — PATIENT ROUNDING (BHMG ONLY) (OUTPATIENT)
Dept: OBSTETRICS AND GYNECOLOGY | Age: 32
End: 2022-05-05

## 2022-05-05 NOTE — PROGRESS NOTES
A MY CHART MESSAGE HAS BEEN SENT TO THE PATIENT FOR [facility].

## 2022-05-09 LAB
CYTOLOGIST CVX/VAG CYTO: NORMAL
CYTOLOGY CVX/VAG DOC CYTO: NORMAL
CYTOLOGY CVX/VAG DOC THIN PREP: NORMAL
DX ICD CODE: NORMAL
HIV 1 & 2 AB SER-IMP: NORMAL
HPV I/H RISK 4 DNA CVX QL PROBE+SIG AMP: NEGATIVE
OTHER STN SPEC: NORMAL
STAT OF ADQ CVX/VAG CYTO-IMP: NORMAL

## 2022-05-17 ENCOUNTER — ROUTINE PRENATAL (OUTPATIENT)
Dept: OBSTETRICS AND GYNECOLOGY | Age: 32
End: 2022-05-17

## 2022-05-17 VITALS — WEIGHT: 135 LBS | BODY MASS INDEX: 21.14 KG/M2 | DIASTOLIC BLOOD PRESSURE: 68 MMHG | SYSTOLIC BLOOD PRESSURE: 112 MMHG

## 2022-05-17 DIAGNOSIS — Z34.81 PRENATAL CARE, SUBSEQUENT PREGNANCY, FIRST TRIMESTER: Primary | ICD-10-CM

## 2022-05-17 LAB
CLARITY, POC: CLEAR
COLOR UR: YELLOW
GLUCOSE UR STRIP-MCNC: NEGATIVE MG/DL
PROT UR STRIP-MCNC: NEGATIVE MG/DL

## 2022-05-17 PROCEDURE — 0502F SUBSEQUENT PRENATAL CARE: CPT | Performed by: OBSTETRICS & GYNECOLOGY

## 2022-05-17 RX ORDER — CETIRIZINE HYDROCHLORIDE 10 MG/1
10 TABLET ORAL DAILY
COMMUNITY
End: 2022-07-12

## 2022-05-17 RX ORDER — DIPHENHYDRAMINE HCL 25 MG
25 CAPSULE ORAL EVERY 6 HOURS PRN
COMMUNITY
End: 2022-07-12

## 2022-05-17 NOTE — PROGRESS NOTES
Chief Complaint   Patient presents with   • Routine Prenatal Visit     NOB:lmp 3/24/22     HPI- Pt is 32 y.o.  at Unknown here for prenatal visit.     ROS-     - No vaginal bleeding    GI- No abdominal pain    /68   Wt 61.2 kg (135 lb)   LMP 2022 (Approximate)   BMI 21.14 kg/m²   Exam - See flow sheet    Fetal heart rate is normal    Assessment-  Diagnoses and all orders for this visit:    Prenatal care, subsequent pregnancy, first trimester  -     POC Urinalysis Dipstick    Other orders  -     cetirizine (zyrTEC) 10 MG tablet; Take 10 mg by mouth Daily.  -     diphenhydrAMINE (BENADRYL) 25 mg capsule; Take 25 mg by mouth Every 6 (Six) Hours As Needed for Itching.    Reviewed labs, patient is B+, hemoglobin 12.4  Ultrasound for confirmation of viability is pending

## 2022-06-14 ENCOUNTER — ROUTINE PRENATAL (OUTPATIENT)
Dept: OBSTETRICS AND GYNECOLOGY | Age: 32
End: 2022-06-14

## 2022-06-14 VITALS — DIASTOLIC BLOOD PRESSURE: 64 MMHG | BODY MASS INDEX: 21.61 KG/M2 | WEIGHT: 138 LBS | SYSTOLIC BLOOD PRESSURE: 114 MMHG

## 2022-06-14 DIAGNOSIS — Z34.81 PRENATAL CARE, SUBSEQUENT PREGNANCY, FIRST TRIMESTER: Primary | ICD-10-CM

## 2022-06-14 PROBLEM — Z98.891 HISTORY OF C-SECTION: Status: ACTIVE | Noted: 2022-06-14

## 2022-06-14 PROCEDURE — 0502F SUBSEQUENT PRENATAL CARE: CPT | Performed by: OBSTETRICS & GYNECOLOGY

## 2022-06-14 NOTE — PROGRESS NOTES
Chief Complaint   Patient presents with   • Routine Prenatal Visit     HPI- Pt is 32 y.o.  at 11w5d here for prenatal visit.     ROS-     - No vaginal bleeding    GI- No abdominal pain    /64   Wt 62.6 kg (138 lb)   LMP 2022 (Approximate)   BMI 21.61 kg/m²   Exam - See flow sheet    Fetal heart rate is normal    Assessment-  Diagnoses and all orders for this visit:    Prenatal care, subsequent pregnancy, first trimester  -     POC Urinalysis Dipstick    Patient states she slipped on the steps going to the basement yesterday, not had any cramping or bleeding.  She was able to catch her self on the rails and her bottom did not make contact with the ground.  Patient is known to be be positive patient declined cell free DNA testing  Handheld ultrasound showed 11 weeks size fetus with cardiac activity  Lab work reviewed  Return 4 weeks ultrasound in 8 weeks  Patient to call with any change in her condition or questions

## 2022-07-12 ENCOUNTER — ROUTINE PRENATAL (OUTPATIENT)
Dept: OBSTETRICS AND GYNECOLOGY | Age: 32
End: 2022-07-12

## 2022-07-12 VITALS — WEIGHT: 141 LBS | BODY MASS INDEX: 22.08 KG/M2 | SYSTOLIC BLOOD PRESSURE: 108 MMHG | DIASTOLIC BLOOD PRESSURE: 68 MMHG

## 2022-07-12 DIAGNOSIS — Z3A.15 15 WEEKS GESTATION OF PREGNANCY: Primary | ICD-10-CM

## 2022-07-12 DIAGNOSIS — Z13.89 SCREENING FOR BLOOD OR PROTEIN IN URINE: ICD-10-CM

## 2022-07-12 LAB
GLUCOSE UR STRIP-MCNC: NEGATIVE MG/DL
PROT UR STRIP-MCNC: NEGATIVE MG/DL

## 2022-07-12 PROCEDURE — 0502F SUBSEQUENT PRENATAL CARE: CPT | Performed by: NURSE PRACTITIONER

## 2022-07-12 NOTE — PROGRESS NOTES
Doing well  Reports good FM  Denies LOF, bleeding or ctx's  Having some mild intermittent epigastric discomfort more so in the evening and morning. No nausea or vomiting. Rec trying tums or pepcid.  Declines cfDNA  F/u 4 weeks for anatomy and ob check with Dr Palacios

## 2022-08-09 ENCOUNTER — ROUTINE PRENATAL (OUTPATIENT)
Dept: OBSTETRICS AND GYNECOLOGY | Age: 32
End: 2022-08-09

## 2022-08-09 VITALS — WEIGHT: 143 LBS | DIASTOLIC BLOOD PRESSURE: 62 MMHG | SYSTOLIC BLOOD PRESSURE: 108 MMHG | BODY MASS INDEX: 22.4 KG/M2

## 2022-08-09 DIAGNOSIS — Z34.82 PRENATAL CARE, SUBSEQUENT PREGNANCY, SECOND TRIMESTER: Primary | ICD-10-CM

## 2022-08-09 DIAGNOSIS — Z98.891 HISTORY OF C-SECTION: ICD-10-CM

## 2022-08-09 PROCEDURE — 0502F SUBSEQUENT PRENATAL CARE: CPT | Performed by: OBSTETRICS & GYNECOLOGY

## 2022-08-09 NOTE — PROGRESS NOTES
Ultrasound did not reveal heart-shaped uterus but discussed limitations  Anatomy within normal limits  Cervix greater than 4.6  Posterior placenta no previa  Gender surprise  Patient with occasional round ligament pain

## 2022-09-07 ENCOUNTER — ROUTINE PRENATAL (OUTPATIENT)
Dept: OBSTETRICS AND GYNECOLOGY | Age: 32
End: 2022-09-07

## 2022-09-07 VITALS — SYSTOLIC BLOOD PRESSURE: 110 MMHG | BODY MASS INDEX: 23.34 KG/M2 | DIASTOLIC BLOOD PRESSURE: 64 MMHG | WEIGHT: 149 LBS

## 2022-09-07 DIAGNOSIS — Z34.82 PRENATAL CARE, SUBSEQUENT PREGNANCY, SECOND TRIMESTER: Primary | ICD-10-CM

## 2022-09-07 PROCEDURE — 0502F SUBSEQUENT PRENATAL CARE: CPT | Performed by: OBSTETRICS & GYNECOLOGY

## 2022-09-07 RX ORDER — CLOTRIMAZOLE AND BETAMETHASONE DIPROPIONATE 10; .64 MG/G; MG/G
1 CREAM TOPICAL 2 TIMES DAILY
Qty: 45 G | Refills: 1 | Status: SHIPPED | OUTPATIENT
Start: 2022-09-07 | End: 2023-03-28

## 2022-09-07 NOTE — PROGRESS NOTES
Chief Complaint   Patient presents with   • Routine Prenatal Visit     HPI- Pt is 32 y.o.  at 23w6d here for prenatal visit.     ROS-     - No vaginal bleeding    GI- No abdominal pain    /64   Wt 67.6 kg (149 lb)   LMP 2022 (Approximate)   BMI 23.34 kg/m²   Exam - See flow sheet    Fetal heart rate is normal    Assessment-  Diagnoses and all orders for this visit:    Prenatal care, subsequent pregnancy, second trimester  -     POC Urinalysis Dipstick    Other orders  -     clotrimazole-betamethasone (LOTRISONE) 1-0.05 % cream; Apply 1 application topically to the appropriate area as directed 2 (Two) Times a Day. Apply to affected area twice daily 7 days    Overall doing well  Had 1 vasovagal type of symptom at work discussed treatment and prevention  Occasional vulvar itching intermittent will call in some Lotrisone as needed  Glucose hemoglobin Tdap next visit

## 2022-10-05 ENCOUNTER — PREP FOR SURGERY (OUTPATIENT)
Dept: OTHER | Facility: HOSPITAL | Age: 32
End: 2022-10-05

## 2022-10-05 ENCOUNTER — ROUTINE PRENATAL (OUTPATIENT)
Dept: OBSTETRICS AND GYNECOLOGY | Age: 32
End: 2022-10-05

## 2022-10-05 VITALS — BODY MASS INDEX: 23.65 KG/M2 | DIASTOLIC BLOOD PRESSURE: 60 MMHG | WEIGHT: 151 LBS | SYSTOLIC BLOOD PRESSURE: 104 MMHG

## 2022-10-05 DIAGNOSIS — Z98.891 HISTORY OF C-SECTION: Primary | ICD-10-CM

## 2022-10-05 DIAGNOSIS — Z98.891 HISTORY OF C-SECTION: ICD-10-CM

## 2022-10-05 DIAGNOSIS — Z34.82 PRENATAL CARE, SUBSEQUENT PREGNANCY, SECOND TRIMESTER: Primary | ICD-10-CM

## 2022-10-05 LAB
CLARITY, POC: CLEAR
COLOR UR: YELLOW
GLUCOSE UR STRIP-MCNC: NEGATIVE MG/DL
PROT UR STRIP-MCNC: ABNORMAL MG/DL

## 2022-10-05 PROCEDURE — 90686 IIV4 VACC NO PRSV 0.5 ML IM: CPT | Performed by: OBSTETRICS & GYNECOLOGY

## 2022-10-05 PROCEDURE — 90715 TDAP VACCINE 7 YRS/> IM: CPT | Performed by: OBSTETRICS & GYNECOLOGY

## 2022-10-05 PROCEDURE — 0502F SUBSEQUENT PRENATAL CARE: CPT | Performed by: OBSTETRICS & GYNECOLOGY

## 2022-10-05 PROCEDURE — 90472 IMMUNIZATION ADMIN EACH ADD: CPT | Performed by: OBSTETRICS & GYNECOLOGY

## 2022-10-05 PROCEDURE — 90471 IMMUNIZATION ADMIN: CPT | Performed by: OBSTETRICS & GYNECOLOGY

## 2022-10-05 RX ORDER — METHYLERGONOVINE MALEATE 0.2 MG/ML
200 INJECTION INTRAVENOUS AS NEEDED
Status: CANCELLED | OUTPATIENT
Start: 2022-10-05

## 2022-10-05 RX ORDER — LIDOCAINE HYDROCHLORIDE 10 MG/ML
5 INJECTION, SOLUTION EPIDURAL; INFILTRATION; INTRACAUDAL; PERINEURAL AS NEEDED
Status: CANCELLED | OUTPATIENT
Start: 2022-10-05

## 2022-10-05 RX ORDER — SODIUM CHLORIDE 0.9 % (FLUSH) 0.9 %
1-10 SYRINGE (ML) INJECTION AS NEEDED
Status: CANCELLED | OUTPATIENT
Start: 2022-10-05

## 2022-10-05 RX ORDER — MORPHINE SULFATE 2 MG/ML
2 INJECTION, SOLUTION INTRAMUSCULAR; INTRAVENOUS
Status: CANCELLED | OUTPATIENT
Start: 2022-10-05

## 2022-10-05 RX ORDER — SODIUM CHLORIDE, SODIUM LACTATE, POTASSIUM CHLORIDE, CALCIUM CHLORIDE 600; 310; 30; 20 MG/100ML; MG/100ML; MG/100ML; MG/100ML
125 INJECTION, SOLUTION INTRAVENOUS CONTINUOUS
Status: CANCELLED | OUTPATIENT
Start: 2022-10-05

## 2022-10-05 RX ORDER — CARBOPROST TROMETHAMINE 250 UG/ML
250 INJECTION, SOLUTION INTRAMUSCULAR AS NEEDED
Status: CANCELLED | OUTPATIENT
Start: 2022-10-05

## 2022-10-05 RX ORDER — SODIUM CHLORIDE 0.9 % (FLUSH) 0.9 %
10 SYRINGE (ML) INJECTION EVERY 12 HOURS SCHEDULED
Status: CANCELLED | OUTPATIENT
Start: 2022-10-05

## 2022-10-05 RX ORDER — ACETAMINOPHEN 500 MG
1000 TABLET ORAL ONCE
Status: CANCELLED | OUTPATIENT
Start: 2022-10-05 | End: 2022-10-05

## 2022-10-05 RX ORDER — ONDANSETRON 4 MG/1
4 TABLET, FILM COATED ORAL EVERY 6 HOURS PRN
Status: CANCELLED | OUTPATIENT
Start: 2022-10-05

## 2022-10-05 RX ORDER — ONDANSETRON 2 MG/ML
4 INJECTION INTRAMUSCULAR; INTRAVENOUS EVERY 6 HOURS PRN
Status: CANCELLED | OUTPATIENT
Start: 2022-10-05

## 2022-10-05 RX ORDER — KETOROLAC TROMETHAMINE 30 MG/ML
30 INJECTION, SOLUTION INTRAMUSCULAR; INTRAVENOUS ONCE
Status: CANCELLED | OUTPATIENT
Start: 2022-10-05 | End: 2022-10-05

## 2022-10-05 NOTE — PROGRESS NOTES
Chief Complaint   Patient presents with   • Routine Prenatal Visit     1 hr gtt     HPI- Pt is 32 y.o.  at 27w6d here for prenatal visit.     ROS-     - No vaginal bleeding    GI- No abdominal pain    /60   Wt 68.5 kg (151 lb)   LMP 2022 (Approximate)   BMI 23.65 kg/m²   Exam - See flow sheet    Fetal heart rate is normal    Assessment-  Diagnoses and all orders for this visit:    Prenatal care, subsequent pregnancy, second trimester  -     POC Urinalysis Dipstick  -     Gestational Screen 1 Hr ([facility])  -     Hemoglobin    History of     Patient doing well, slowing down on her running  Has only had 1 more vasovagal episode since making the suggested changes  Glucose hemoglobin Tdap today  Reviewed options of  patient wants to proceed with repeat  which we will schedule during the 39th week.  She is not interested in a tubal ligation  Overall doing well will call with any change in condition.

## 2022-10-06 ENCOUNTER — TELEPHONE (OUTPATIENT)
Dept: OBSTETRICS AND GYNECOLOGY | Age: 32
End: 2022-10-06

## 2022-10-06 LAB
GLUCOSE 1H P 50 G GLC PO SERPL-MCNC: 48 MG/DL (ref 65–139)
HGB BLD-MCNC: 11.4 G/DL (ref 12–15.9)

## 2022-10-06 NOTE — TELEPHONE ENCOUNTER
Patient states she feels fine.Is at work now.Will call if she notices anything worrisome.No dizziness,light headedness,fatigue.

## 2022-10-19 ENCOUNTER — ROUTINE PRENATAL (OUTPATIENT)
Dept: OBSTETRICS AND GYNECOLOGY | Age: 32
End: 2022-10-19

## 2022-10-19 VITALS — WEIGHT: 156 LBS | SYSTOLIC BLOOD PRESSURE: 110 MMHG | BODY MASS INDEX: 24.43 KG/M2 | DIASTOLIC BLOOD PRESSURE: 70 MMHG

## 2022-10-19 DIAGNOSIS — Z3A.29 29 WEEKS GESTATION OF PREGNANCY: ICD-10-CM

## 2022-10-19 DIAGNOSIS — Z13.89 SCREENING FOR BLOOD OR PROTEIN IN URINE: ICD-10-CM

## 2022-10-19 DIAGNOSIS — Z98.891 HISTORY OF C-SECTION: ICD-10-CM

## 2022-10-19 DIAGNOSIS — N89.8 VAGINAL PRURITUS: ICD-10-CM

## 2022-10-19 DIAGNOSIS — Z34.83 ENCOUNTER FOR SUPERVISION OF OTHER NORMAL PREGNANCY IN THIRD TRIMESTER: Primary | ICD-10-CM

## 2022-10-19 LAB
GLUCOSE UR STRIP-MCNC: NEGATIVE MG/DL
PROT UR STRIP-MCNC: NEGATIVE MG/DL

## 2022-10-19 PROCEDURE — 0502F SUBSEQUENT PRENATAL CARE: CPT | Performed by: NURSE PRACTITIONER

## 2022-10-19 NOTE — PROGRESS NOTES
"Doing well  Reports good FM  Denies LOF, bleeding or ctx's  S/p tdap and flu  She has been having some ongoing external itching intermittently  Was given Lotrisone which does seem to help but still \"doesn't feel right\"  On exam there is thick white discharge adherent to vaginal walls c/w yeast  She will obtain monistat 7 day and start treatment  Swab sent for BV and yeast  F/u 2 weeks with Dr. Palacios   PTL/FKC discussed  F/u 2 weeks  "

## 2022-10-20 LAB
A VAGINAE DNA VAG QL NAA+PROBE: ABNORMAL SCORE
BVAB2 DNA VAG QL NAA+PROBE: ABNORMAL SCORE
C ALBICANS DNA VAG QL NAA+PROBE: POSITIVE
C GLABRATA DNA VAG QL NAA+PROBE: NEGATIVE
MEGA1 DNA VAG QL NAA+PROBE: ABNORMAL SCORE

## 2022-10-21 ENCOUNTER — TELEPHONE (OUTPATIENT)
Dept: OBSTETRICS AND GYNECOLOGY | Age: 32
End: 2022-10-21

## 2022-10-21 RX ORDER — MICONAZOLE NITRATE 1200MG-2%
1 KIT VAGINAL
Qty: 1 KIT | Refills: 0 | Status: SHIPPED | OUTPATIENT
Start: 2022-10-21 | End: 2022-12-24 | Stop reason: HOSPADM

## 2022-10-21 NOTE — TELEPHONE ENCOUNTER
PT notified of test results and verified pharmacy with patient. Please send in medication for yeast and pt will call if still having sx after finishing script

## 2022-11-02 ENCOUNTER — ROUTINE PRENATAL (OUTPATIENT)
Dept: OBSTETRICS AND GYNECOLOGY | Age: 32
End: 2022-11-02

## 2022-11-02 VITALS — DIASTOLIC BLOOD PRESSURE: 68 MMHG | SYSTOLIC BLOOD PRESSURE: 120 MMHG | WEIGHT: 157 LBS | BODY MASS INDEX: 24.59 KG/M2

## 2022-11-02 DIAGNOSIS — Z98.891 HISTORY OF C-SECTION: ICD-10-CM

## 2022-11-02 DIAGNOSIS — Z34.83 PRENATAL CARE, SUBSEQUENT PREGNANCY, THIRD TRIMESTER: Primary | ICD-10-CM

## 2022-11-02 PROCEDURE — 0502F SUBSEQUENT PRENATAL CARE: CPT | Performed by: OBSTETRICS & GYNECOLOGY

## 2022-11-02 NOTE — PROGRESS NOTES
Today's ultrasound reassuring 4 pounds 4 ounces 48th percentile, vertex presentation  No heart shape to the uterus was seen  Patient reports good fetal movement  She will start taking an extra iron.  She is up to her Tdap and flu.  Repeat  scheduled  Third trimester concerns addressed

## 2022-11-16 ENCOUNTER — ROUTINE PRENATAL (OUTPATIENT)
Dept: OBSTETRICS AND GYNECOLOGY | Age: 32
End: 2022-11-16

## 2022-11-16 VITALS — SYSTOLIC BLOOD PRESSURE: 100 MMHG | WEIGHT: 159 LBS | DIASTOLIC BLOOD PRESSURE: 64 MMHG | BODY MASS INDEX: 24.9 KG/M2

## 2022-11-16 DIAGNOSIS — Z98.891 HISTORY OF C-SECTION: ICD-10-CM

## 2022-11-16 DIAGNOSIS — Z34.83 PRENATAL CARE, SUBSEQUENT PREGNANCY, THIRD TRIMESTER: Primary | ICD-10-CM

## 2022-11-16 PROCEDURE — 0502F SUBSEQUENT PRENATAL CARE: CPT | Performed by: OBSTETRICS & GYNECOLOGY

## 2022-11-16 NOTE — PROGRESS NOTES
Patient doing quite well, exercising very regularly good fetal movement.  Physical exam reassuring today blood pressure is normal no protein  Viewed signs symptoms of third Mester repeat  is scheduled for   We will call with any change otherwise see back in 2 weeks

## 2022-11-30 ENCOUNTER — ROUTINE PRENATAL (OUTPATIENT)
Dept: OBSTETRICS AND GYNECOLOGY | Age: 32
End: 2022-11-30

## 2022-11-30 VITALS — DIASTOLIC BLOOD PRESSURE: 66 MMHG | WEIGHT: 159 LBS | BODY MASS INDEX: 24.9 KG/M2 | SYSTOLIC BLOOD PRESSURE: 110 MMHG

## 2022-11-30 DIAGNOSIS — Z3A.35 35 WEEKS GESTATION OF PREGNANCY: Primary | ICD-10-CM

## 2022-11-30 DIAGNOSIS — Z36.85 ANTENATAL SCREENING FOR STREPTOCOCCUS B: ICD-10-CM

## 2022-11-30 DIAGNOSIS — Z13.89 SCREENING FOR HEMATURIA OR PROTEINURIA: ICD-10-CM

## 2022-11-30 LAB
GLUCOSE UR STRIP-MCNC: NEGATIVE MG/DL
PROT UR STRIP-MCNC: NEGATIVE MG/DL

## 2022-11-30 PROCEDURE — 0502F SUBSEQUENT PRENATAL CARE: CPT | Performed by: OBSTETRICS & GYNECOLOGY

## 2022-11-30 NOTE — PROGRESS NOTES
Chief Complaint   Patient presents with   • Routine Prenatal Visit     35w6d, GBS today, No Concerns at this time     HPI- Pt is 32 y.o.  at 35w6d here for prenatal visit.     ROS-     - No vaginal bleeding    GI- No abdominal pain    /66   Wt 72.1 kg (159 lb)   LMP 2022 (Approximate)   BMI 24.90 kg/m²   Exam - See flow sheet    Fetal heart rate is normal    Assessment-  Diagnoses and all orders for this visit:    35 weeks gestation of pregnancy  -     POC Urinalysis Dipstick  -     Strep Grp B ROSANA + Reflex - Swab, Vaginal/Rectum    Screening for hematuria or proteinuria  -     POC Urinalysis Dipstick     screening for streptococcus B  -     Strep Grp B ROSANA + Reflex - Swab, Vaginal/Rectum    Patient doing well cervix long thick and closed vertex presentation signs symptoms of labor reviewed  Milad  scheduled for 1222

## 2022-12-02 LAB — GP B STREP DNA SPEC QL NAA+PROBE: NEGATIVE

## 2022-12-07 ENCOUNTER — ROUTINE PRENATAL (OUTPATIENT)
Dept: OBSTETRICS AND GYNECOLOGY | Age: 32
End: 2022-12-07

## 2022-12-07 VITALS — WEIGHT: 160 LBS | DIASTOLIC BLOOD PRESSURE: 70 MMHG | SYSTOLIC BLOOD PRESSURE: 110 MMHG | BODY MASS INDEX: 25.06 KG/M2

## 2022-12-07 DIAGNOSIS — Z3A.36 36 WEEKS GESTATION OF PREGNANCY: Primary | ICD-10-CM

## 2022-12-07 DIAGNOSIS — Z98.891 HISTORY OF C-SECTION: ICD-10-CM

## 2022-12-07 LAB
GLUCOSE UR STRIP-MCNC: NEGATIVE MG/DL
PROT UR STRIP-MCNC: NEGATIVE MG/DL

## 2022-12-07 PROCEDURE — 0502F SUBSEQUENT PRENATAL CARE: CPT | Performed by: OBSTETRICS & GYNECOLOGY

## 2022-12-07 NOTE — PROGRESS NOTES
Overall patient doing well good fetal movement no real contractions no unusual discharge no unusual bleeding  Physical exam reassuring today  Cervix long thick and closed vertex -2 but not well engaged  Repeat  scheduled GBS negative signs symptoms of labor again reviewed

## 2022-12-13 ENCOUNTER — ROUTINE PRENATAL (OUTPATIENT)
Dept: OBSTETRICS AND GYNECOLOGY | Age: 32
End: 2022-12-13

## 2022-12-13 VITALS — BODY MASS INDEX: 25.18 KG/M2 | WEIGHT: 160.8 LBS | DIASTOLIC BLOOD PRESSURE: 72 MMHG | SYSTOLIC BLOOD PRESSURE: 110 MMHG

## 2022-12-13 DIAGNOSIS — Z98.891 HISTORY OF C-SECTION: ICD-10-CM

## 2022-12-13 DIAGNOSIS — Z13.89 SCREENING FOR BLOOD OR PROTEIN IN URINE: Primary | ICD-10-CM

## 2022-12-13 LAB
GLUCOSE UR STRIP-MCNC: NEGATIVE MG/DL
PROT UR STRIP-MCNC: NEGATIVE MG/DL

## 2022-12-13 PROCEDURE — 0502F SUBSEQUENT PRENATAL CARE: CPT | Performed by: OBSTETRICS & GYNECOLOGY

## 2022-12-13 NOTE — PROGRESS NOTES
Chief Complaint   Patient presents with   • Routine Prenatal Visit     ob check, 37w5d, no complaints     HPI- Pt is 32 y.o.  at 37w5d here for prenatal visit.     ROS-     - No vaginal bleeding    GI- No abdominal pain    /72   Wt 72.9 kg (160 lb 12.8 oz)   LMP 2022 (Approximate)   BMI 25.18 kg/m²   Exam - See flow sheet    Fetal heart rate is normal    Assessment-  Diagnoses and all orders for this visit:    Screening for blood or protein in urine  -     POC Urinalysis Dipstick    History of

## 2022-12-21 ENCOUNTER — ROUTINE PRENATAL (OUTPATIENT)
Dept: OBSTETRICS AND GYNECOLOGY | Age: 32
End: 2022-12-21

## 2022-12-21 VITALS — BODY MASS INDEX: 25.22 KG/M2 | DIASTOLIC BLOOD PRESSURE: 70 MMHG | SYSTOLIC BLOOD PRESSURE: 112 MMHG | WEIGHT: 161 LBS

## 2022-12-21 DIAGNOSIS — Z3A.38 38 WEEKS GESTATION OF PREGNANCY: Primary | ICD-10-CM

## 2022-12-21 DIAGNOSIS — Z13.89 SCREENING FOR HEMATURIA OR PROTEINURIA: ICD-10-CM

## 2022-12-21 LAB
GLUCOSE UR STRIP-MCNC: NEGATIVE MG/DL
PROT UR STRIP-MCNC: NEGATIVE MG/DL

## 2022-12-21 PROCEDURE — 0502F SUBSEQUENT PRENATAL CARE: CPT | Performed by: OBSTETRICS & GYNECOLOGY

## 2022-12-21 NOTE — PROGRESS NOTES
Chief Complaint   Patient presents with   • Routine Prenatal Visit     38w6d, C/O Possible Yeast infection, Possible Hemorrhoids. C/O Stomach pain/discomfort last night lasting about an hour, felt like a stomach ache, feeling better today.      HPI- Pt is 32 y.o.  at 38w6d here for prenatal visit.     ROS-     - No vaginal bleeding    GI- No abdominal pain    /70   Wt 73 kg (161 lb)   LMP 2022 (Approximate)   BMI 25.22 kg/m²   Exam - See flow sheet    Fetal heart rate is normal    Assessment-  Diagnoses and all orders for this visit:    38 weeks gestation of pregnancy  -     POC Urinalysis Dipstick    Screening for hematuria or proteinuria  -     POC Urinalysis Dipstick    Good fetal movement, no contractions, physical exam reassuring, plan repeat  tomorrow morning, recommended lots of hydration and decrease activities today

## 2022-12-22 ENCOUNTER — HOSPITAL ENCOUNTER (INPATIENT)
Facility: HOSPITAL | Age: 32
LOS: 2 days | Discharge: HOME | End: 2022-12-24
Attending: OBSTETRICS & GYNECOLOGY | Admitting: OBSTETRICS & GYNECOLOGY
Payer: COMMERCIAL

## 2022-12-22 ENCOUNTER — ANESTHESIA EVENT (OUTPATIENT)
Dept: LABOR AND DELIVERY | Facility: HOSPITAL | Age: 32
End: 2022-12-22
Payer: COMMERCIAL

## 2022-12-22 ENCOUNTER — ANESTHESIA (OUTPATIENT)
Dept: LABOR AND DELIVERY | Facility: HOSPITAL | Age: 32
End: 2022-12-22
Payer: COMMERCIAL

## 2022-12-22 DIAGNOSIS — Z98.891 HISTORY OF C-SECTION: ICD-10-CM

## 2022-12-22 DIAGNOSIS — Z98.891 S/P REPEAT LOW TRANSVERSE C-SECTION: Primary | ICD-10-CM

## 2022-12-22 PROBLEM — Z87.59 HISTORY OF POSTPARTUM DEPRESSION: Status: ACTIVE | Noted: 2022-12-22

## 2022-12-22 PROBLEM — Z86.59 HISTORY OF POSTPARTUM DEPRESSION: Status: ACTIVE | Noted: 2022-12-22

## 2022-12-22 LAB
ABO GROUP BLD: NORMAL
BASOPHILS # BLD AUTO: 0.03 10*3/MM3 (ref 0–0.2)
BASOPHILS NFR BLD AUTO: 0.4 % (ref 0–1.5)
BLD GP AB SCN SERPL QL: NEGATIVE
DEPRECATED RDW RBC AUTO: 40.9 FL (ref 37–54)
EOSINOPHIL # BLD AUTO: 0.12 10*3/MM3 (ref 0–0.4)
EOSINOPHIL NFR BLD AUTO: 1.6 % (ref 0.3–6.2)
ERYTHROCYTE [DISTWIDTH] IN BLOOD BY AUTOMATED COUNT: 12.4 % (ref 12.3–15.4)
HCT VFR BLD AUTO: 34.5 % (ref 34–46.6)
HGB BLD-MCNC: 11.4 G/DL (ref 12–15.9)
IMM GRANULOCYTES # BLD AUTO: 0.03 10*3/MM3 (ref 0–0.05)
IMM GRANULOCYTES NFR BLD AUTO: 0.4 % (ref 0–0.5)
LYMPHOCYTES # BLD AUTO: 1.26 10*3/MM3 (ref 0.7–3.1)
LYMPHOCYTES NFR BLD AUTO: 16.8 % (ref 19.6–45.3)
MCH RBC QN AUTO: 30 PG (ref 26.6–33)
MCHC RBC AUTO-ENTMCNC: 33 G/DL (ref 31.5–35.7)
MCV RBC AUTO: 90.8 FL (ref 79–97)
MONOCYTES # BLD AUTO: 0.51 10*3/MM3 (ref 0.1–0.9)
MONOCYTES NFR BLD AUTO: 6.8 % (ref 5–12)
NEUTROPHILS NFR BLD AUTO: 5.54 10*3/MM3 (ref 1.7–7)
NEUTROPHILS NFR BLD AUTO: 74 % (ref 42.7–76)
NRBC BLD AUTO-RTO: 0 /100 WBC (ref 0–0.2)
PLATELET # BLD AUTO: 180 10*3/MM3 (ref 140–450)
PMV BLD AUTO: 10.9 FL (ref 6–12)
RBC # BLD AUTO: 3.8 10*6/MM3 (ref 3.77–5.28)
RH BLD: POSITIVE
T&S EXPIRATION DATE: NORMAL
WBC NRBC COR # BLD: 7.49 10*3/MM3 (ref 3.4–10.8)

## 2022-12-22 PROCEDURE — 25010000002 DEXAMETHASONE SODIUM PHOSPHATE 20 MG/5ML SOLUTION: Performed by: REGISTERED NURSE

## 2022-12-22 PROCEDURE — 86900 BLOOD TYPING SEROLOGIC ABO: CPT | Performed by: OBSTETRICS & GYNECOLOGY

## 2022-12-22 PROCEDURE — 86901 BLOOD TYPING SEROLOGIC RH(D): CPT | Performed by: OBSTETRICS & GYNECOLOGY

## 2022-12-22 PROCEDURE — 25010000002 ONDANSETRON PER 1 MG: Performed by: ANESTHESIOLOGY

## 2022-12-22 PROCEDURE — 25010000002 METHYLERGONOVINE MALEATE PER 0.2 MG: Performed by: OBSTETRICS & GYNECOLOGY

## 2022-12-22 PROCEDURE — 25010000002 KETOROLAC TROMETHAMINE PER 15 MG: Performed by: REGISTERED NURSE

## 2022-12-22 PROCEDURE — S0260 H&P FOR SURGERY: HCPCS | Performed by: OBSTETRICS & GYNECOLOGY

## 2022-12-22 PROCEDURE — 25010000002 OXYTOCIN PER 10 UNITS: Performed by: OBSTETRICS & GYNECOLOGY

## 2022-12-22 PROCEDURE — 25010000002 MORPHINE PER 10 MG: Performed by: ANESTHESIOLOGY

## 2022-12-22 PROCEDURE — 85025 COMPLETE CBC W/AUTO DIFF WBC: CPT | Performed by: OBSTETRICS & GYNECOLOGY

## 2022-12-22 PROCEDURE — 25010000002 PHENYLEPHRINE 10 MG/ML SOLUTION: Performed by: REGISTERED NURSE

## 2022-12-22 PROCEDURE — 25010000002 KETOROLAC TROMETHAMINE PER 15 MG: Performed by: OBSTETRICS & GYNECOLOGY

## 2022-12-22 PROCEDURE — 25010000002 ONDANSETRON PER 1 MG: Performed by: REGISTERED NURSE

## 2022-12-22 PROCEDURE — 25010000002 FENTANYL CITRATE (PF) 50 MCG/ML SOLUTION: Performed by: ANESTHESIOLOGY

## 2022-12-22 PROCEDURE — 88307 TISSUE EXAM BY PATHOLOGIST: CPT

## 2022-12-22 PROCEDURE — 86850 RBC ANTIBODY SCREEN: CPT | Performed by: OBSTETRICS & GYNECOLOGY

## 2022-12-22 PROCEDURE — 63710000001 ONDANSETRON PER 8 MG: Performed by: OBSTETRICS & GYNECOLOGY

## 2022-12-22 PROCEDURE — 59510 CESAREAN DELIVERY: CPT | Performed by: OBSTETRICS & GYNECOLOGY

## 2022-12-22 RX ORDER — SODIUM CHLORIDE, SODIUM LACTATE, POTASSIUM CHLORIDE, CALCIUM CHLORIDE 600; 310; 30; 20 MG/100ML; MG/100ML; MG/100ML; MG/100ML
125 INJECTION, SOLUTION INTRAVENOUS CONTINUOUS
Status: DISCONTINUED | OUTPATIENT
Start: 2022-12-22 | End: 2022-12-22

## 2022-12-22 RX ORDER — TRISODIUM CITRATE DIHYDRATE AND CITRIC ACID MONOHYDRATE 500; 334 MG/5ML; MG/5ML
30 SOLUTION ORAL ONCE
Status: COMPLETED | OUTPATIENT
Start: 2022-12-22 | End: 2022-12-22

## 2022-12-22 RX ORDER — IBUPROFEN 800 MG/1
800 TABLET ORAL EVERY 8 HOURS SCHEDULED
Status: DISCONTINUED | OUTPATIENT
Start: 2022-12-23 | End: 2022-12-24 | Stop reason: HOSPADM

## 2022-12-22 RX ORDER — ONDANSETRON 2 MG/ML
4 INJECTION INTRAMUSCULAR; INTRAVENOUS ONCE AS NEEDED
Status: COMPLETED | OUTPATIENT
Start: 2022-12-22 | End: 2022-12-22

## 2022-12-22 RX ORDER — SODIUM CHLORIDE 0.9 % (FLUSH) 0.9 %
1-10 SYRINGE (ML) INJECTION AS NEEDED
Status: DISCONTINUED | OUTPATIENT
Start: 2022-12-22 | End: 2022-12-22 | Stop reason: HOSPADM

## 2022-12-22 RX ORDER — DEXAMETHASONE SODIUM PHOSPHATE 4 MG/ML
INJECTION, SOLUTION INTRA-ARTICULAR; INTRALESIONAL; INTRAMUSCULAR; INTRAVENOUS; SOFT TISSUE AS NEEDED
Status: DISCONTINUED | OUTPATIENT
Start: 2022-12-22 | End: 2022-12-22 | Stop reason: SURG

## 2022-12-22 RX ORDER — OXYTOCIN/0.9 % SODIUM CHLORIDE 30/500 ML
250 PLASTIC BAG, INJECTION (ML) INTRAVENOUS CONTINUOUS
Status: DISPENSED | OUTPATIENT
Start: 2022-12-22 | End: 2022-12-22

## 2022-12-22 RX ORDER — MORPHINE SULFATE 2 MG/ML
2 INJECTION, SOLUTION INTRAMUSCULAR; INTRAVENOUS
Status: DISCONTINUED | OUTPATIENT
Start: 2022-12-22 | End: 2022-12-22 | Stop reason: HOSPADM

## 2022-12-22 RX ORDER — PHENYLEPHRINE HYDROCHLORIDE 10 MG/ML
INJECTION INTRAVENOUS AS NEEDED
Status: DISCONTINUED | OUTPATIENT
Start: 2022-12-22 | End: 2022-12-22 | Stop reason: SURG

## 2022-12-22 RX ORDER — LIDOCAINE HYDROCHLORIDE 10 MG/ML
5 INJECTION, SOLUTION EPIDURAL; INFILTRATION; INTRACAUDAL; PERINEURAL AS NEEDED
Status: DISCONTINUED | OUTPATIENT
Start: 2022-12-22 | End: 2022-12-22 | Stop reason: HOSPADM

## 2022-12-22 RX ORDER — ONDANSETRON 2 MG/ML
INJECTION INTRAMUSCULAR; INTRAVENOUS AS NEEDED
Status: DISCONTINUED | OUTPATIENT
Start: 2022-12-22 | End: 2022-12-22 | Stop reason: SURG

## 2022-12-22 RX ORDER — PROMETHAZINE HYDROCHLORIDE 12.5 MG/1
12.5 TABLET ORAL EVERY 4 HOURS PRN
Status: DISCONTINUED | OUTPATIENT
Start: 2022-12-22 | End: 2022-12-24 | Stop reason: HOSPADM

## 2022-12-22 RX ORDER — BUPIVACAINE HYDROCHLORIDE 7.5 MG/ML
INJECTION, SOLUTION EPIDURAL; RETROBULBAR
Status: COMPLETED | OUTPATIENT
Start: 2022-12-22 | End: 2022-12-22

## 2022-12-22 RX ORDER — ONDANSETRON 4 MG/1
4 TABLET, FILM COATED ORAL EVERY 8 HOURS PRN
Status: DISCONTINUED | OUTPATIENT
Start: 2022-12-22 | End: 2022-12-24 | Stop reason: HOSPADM

## 2022-12-22 RX ORDER — KETOROLAC TROMETHAMINE 30 MG/ML
INJECTION, SOLUTION INTRAMUSCULAR; INTRAVENOUS AS NEEDED
Status: DISCONTINUED | OUTPATIENT
Start: 2022-12-22 | End: 2022-12-22 | Stop reason: SURG

## 2022-12-22 RX ORDER — KETOROLAC TROMETHAMINE 15 MG/ML
15 INJECTION, SOLUTION INTRAMUSCULAR; INTRAVENOUS EVERY 6 HOURS
Status: COMPLETED | OUTPATIENT
Start: 2022-12-22 | End: 2022-12-23

## 2022-12-22 RX ORDER — OXYTOCIN/0.9 % SODIUM CHLORIDE 30/500 ML
999 PLASTIC BAG, INJECTION (ML) INTRAVENOUS ONCE
Status: COMPLETED | OUTPATIENT
Start: 2022-12-22 | End: 2022-12-22

## 2022-12-22 RX ORDER — ACETAMINOPHEN 500 MG
500 TABLET ORAL EVERY 6 HOURS PRN
Status: DISCONTINUED | OUTPATIENT
Start: 2022-12-22 | End: 2022-12-24 | Stop reason: HOSPADM

## 2022-12-22 RX ORDER — SODIUM CHLORIDE 0.9 % (FLUSH) 0.9 %
10 SYRINGE (ML) INJECTION EVERY 12 HOURS SCHEDULED
Status: DISCONTINUED | OUTPATIENT
Start: 2022-12-22 | End: 2022-12-22 | Stop reason: HOSPADM

## 2022-12-22 RX ORDER — ACETAMINOPHEN 325 MG/1
650 TABLET ORAL EVERY 6 HOURS
Status: DISCONTINUED | OUTPATIENT
Start: 2022-12-23 | End: 2022-12-24 | Stop reason: HOSPADM

## 2022-12-22 RX ORDER — PROMETHAZINE HYDROCHLORIDE 25 MG/1
25 TABLET ORAL EVERY 6 HOURS PRN
Status: DISCONTINUED | OUTPATIENT
Start: 2022-12-22 | End: 2022-12-24 | Stop reason: HOSPADM

## 2022-12-22 RX ORDER — ONDANSETRON 2 MG/ML
4 INJECTION INTRAMUSCULAR; INTRAVENOUS EVERY 6 HOURS PRN
Status: DISCONTINUED | OUTPATIENT
Start: 2022-12-22 | End: 2022-12-22 | Stop reason: HOSPADM

## 2022-12-22 RX ORDER — ACETAMINOPHEN 500 MG
1000 TABLET ORAL EVERY 6 HOURS
Status: COMPLETED | OUTPATIENT
Start: 2022-12-22 | End: 2022-12-23

## 2022-12-22 RX ORDER — CARBOPROST TROMETHAMINE 250 UG/ML
250 INJECTION, SOLUTION INTRAMUSCULAR AS NEEDED
Status: DISCONTINUED | OUTPATIENT
Start: 2022-12-22 | End: 2022-12-22 | Stop reason: HOSPADM

## 2022-12-22 RX ORDER — FENTANYL CITRATE 50 UG/ML
INJECTION, SOLUTION INTRAMUSCULAR; INTRAVENOUS
Status: COMPLETED | OUTPATIENT
Start: 2022-12-22 | End: 2022-12-22

## 2022-12-22 RX ORDER — METHYLERGONOVINE MALEATE 0.2 MG/ML
200 INJECTION INTRAVENOUS AS NEEDED
Status: DISCONTINUED | OUTPATIENT
Start: 2022-12-22 | End: 2022-12-22 | Stop reason: HOSPADM

## 2022-12-22 RX ORDER — FLUCONAZOLE 100 MG/1
100 TABLET ORAL EVERY 24 HOURS
Status: DISCONTINUED | OUTPATIENT
Start: 2022-12-22 | End: 2022-12-24 | Stop reason: HOSPADM

## 2022-12-22 RX ORDER — PROMETHAZINE HYDROCHLORIDE 12.5 MG/1
12.5 SUPPOSITORY RECTAL EVERY 6 HOURS PRN
Status: DISCONTINUED | OUTPATIENT
Start: 2022-12-22 | End: 2022-12-24 | Stop reason: HOSPADM

## 2022-12-22 RX ORDER — ONDANSETRON 4 MG/1
4 TABLET, FILM COATED ORAL EVERY 6 HOURS PRN
Status: DISCONTINUED | OUTPATIENT
Start: 2022-12-22 | End: 2022-12-22 | Stop reason: HOSPADM

## 2022-12-22 RX ORDER — HYDROXYZINE 50 MG/1
50 TABLET, FILM COATED ORAL EVERY 6 HOURS PRN
Status: DISCONTINUED | OUTPATIENT
Start: 2022-12-22 | End: 2022-12-24 | Stop reason: HOSPADM

## 2022-12-22 RX ORDER — ACETAMINOPHEN 500 MG
1000 TABLET ORAL ONCE
Status: COMPLETED | OUTPATIENT
Start: 2022-12-22 | End: 2022-12-22

## 2022-12-22 RX ORDER — METOCLOPRAMIDE 10 MG/1
10 TABLET ORAL ONCE
Status: DISCONTINUED | OUTPATIENT
Start: 2022-12-22 | End: 2022-12-24 | Stop reason: HOSPADM

## 2022-12-22 RX ORDER — KETOROLAC TROMETHAMINE 30 MG/ML
30 INJECTION, SOLUTION INTRAMUSCULAR; INTRAVENOUS ONCE
Status: DISCONTINUED | OUTPATIENT
Start: 2022-12-22 | End: 2022-12-22 | Stop reason: HOSPADM

## 2022-12-22 RX ORDER — OXYCODONE HYDROCHLORIDE 10 MG/1
10 TABLET ORAL EVERY 4 HOURS PRN
Status: DISCONTINUED | OUTPATIENT
Start: 2022-12-22 | End: 2022-12-24 | Stop reason: HOSPADM

## 2022-12-22 RX ORDER — OXYTOCIN/0.9 % SODIUM CHLORIDE 30/500 ML
125 PLASTIC BAG, INJECTION (ML) INTRAVENOUS CONTINUOUS PRN
Status: COMPLETED | OUTPATIENT
Start: 2022-12-22 | End: 2022-12-22

## 2022-12-22 RX ORDER — MORPHINE SULFATE 1 MG/ML
INJECTION, SOLUTION EPIDURAL; INTRATHECAL; INTRAVENOUS
Status: COMPLETED | OUTPATIENT
Start: 2022-12-22 | End: 2022-12-22

## 2022-12-22 RX ORDER — OXYCODONE HYDROCHLORIDE 5 MG/1
5 TABLET ORAL EVERY 4 HOURS PRN
Status: DISCONTINUED | OUTPATIENT
Start: 2022-12-22 | End: 2022-12-24 | Stop reason: HOSPADM

## 2022-12-22 RX ORDER — HYDROCORTISONE 25 MG/G
CREAM TOPICAL 3 TIMES DAILY PRN
Status: DISCONTINUED | OUTPATIENT
Start: 2022-12-22 | End: 2022-12-24 | Stop reason: HOSPADM

## 2022-12-22 RX ORDER — SIMETHICONE 80 MG
80 TABLET,CHEWABLE ORAL 4 TIMES DAILY PRN
Status: DISCONTINUED | OUTPATIENT
Start: 2022-12-22 | End: 2022-12-24 | Stop reason: HOSPADM

## 2022-12-22 RX ORDER — CLINDAMYCIN PHOSPHATE 900 MG/50ML
900 INJECTION INTRAVENOUS ONCE
Status: COMPLETED | OUTPATIENT
Start: 2022-12-22 | End: 2022-12-22

## 2022-12-22 RX ORDER — FAMOTIDINE 10 MG/ML
20 INJECTION, SOLUTION INTRAVENOUS ONCE AS NEEDED
Status: DISCONTINUED | OUTPATIENT
Start: 2022-12-22 | End: 2022-12-22 | Stop reason: HOSPADM

## 2022-12-22 RX ADMIN — ACETAMINOPHEN 1000 MG: 500 TABLET, FILM COATED ORAL at 20:50

## 2022-12-22 RX ADMIN — ONDANSETRON HYDROCHLORIDE 4 MG: 2 SOLUTION INTRAMUSCULAR; INTRAVENOUS at 08:37

## 2022-12-22 RX ADMIN — DEXAMETHASONE SODIUM PHOSPHATE 8 MG: 4 INJECTION, SOLUTION INTRAMUSCULAR; INTRAVENOUS at 09:10

## 2022-12-22 RX ADMIN — SODIUM CITRATE AND CITRIC ACID MONOHYDRATE 30 ML: 500; 334 SOLUTION ORAL at 08:22

## 2022-12-22 RX ADMIN — FENTANYL CITRATE 20 MCG: 0.05 INJECTION, SOLUTION INTRAMUSCULAR; INTRAVENOUS at 08:38

## 2022-12-22 RX ADMIN — ONDANSETRON HYDROCHLORIDE 4 MG: 4 TABLET, FILM COATED ORAL at 18:17

## 2022-12-22 RX ADMIN — OXYTOCIN 125 ML/HR: 10 INJECTION, SOLUTION INTRAMUSCULAR; INTRAVENOUS at 10:23

## 2022-12-22 RX ADMIN — CLINDAMYCIN PHOSPHATE 900 MG: 900 INJECTION, SOLUTION INTRAVENOUS at 08:22

## 2022-12-22 RX ADMIN — BUPIVACAINE HYDROCHLORIDE 1.6 ML: 7.5 INJECTION, SOLUTION EPIDURAL; RETROBULBAR at 08:38

## 2022-12-22 RX ADMIN — KETOROLAC TROMETHAMINE 30 MG: 30 INJECTION, SOLUTION INTRAMUSCULAR; INTRAVENOUS at 09:13

## 2022-12-22 RX ADMIN — ACETAMINOPHEN 1000 MG: 500 TABLET, FILM COATED ORAL at 08:22

## 2022-12-22 RX ADMIN — Medication 999 ML/HR: at 08:59

## 2022-12-22 RX ADMIN — KETOROLAC TROMETHAMINE 15 MG: 15 INJECTION, SOLUTION INTRAMUSCULAR; INTRAVENOUS at 18:11

## 2022-12-22 RX ADMIN — MORPHINE SULFATE 200 MCG: 1 INJECTION, SOLUTION EPIDURAL; INTRATHECAL; INTRAVENOUS at 08:38

## 2022-12-22 RX ADMIN — SODIUM CHLORIDE, POTASSIUM CHLORIDE, SODIUM LACTATE AND CALCIUM CHLORIDE 1000 ML: 600; 310; 30; 20 INJECTION, SOLUTION INTRAVENOUS at 06:57

## 2022-12-22 RX ADMIN — FENTANYL CITRATE 30 MCG: 0.05 INJECTION, SOLUTION INTRAMUSCULAR; INTRAVENOUS at 09:13

## 2022-12-22 RX ADMIN — METHYLERGONOVINE MALEATE 200 MCG: 0.2 INJECTION INTRAVENOUS at 11:23

## 2022-12-22 RX ADMIN — SODIUM CHLORIDE, POTASSIUM CHLORIDE, SODIUM LACTATE AND CALCIUM CHLORIDE 125 ML/HR: 600; 310; 30; 20 INJECTION, SOLUTION INTRAVENOUS at 08:23

## 2022-12-22 RX ADMIN — PHENYLEPHRINE HYDROCHLORIDE 100 MCG: 10 INJECTION INTRAVENOUS at 08:39

## 2022-12-22 RX ADMIN — ACETAMINOPHEN 1000 MG: 500 TABLET, FILM COATED ORAL at 14:11

## 2022-12-22 RX ADMIN — ONDANSETRON 4 MG: 2 INJECTION INTRAMUSCULAR; INTRAVENOUS at 08:22

## 2022-12-22 NOTE — ANESTHESIA POSTPROCEDURE EVALUATION
"Patient: Suzanne Wade    Procedure Summary     Date: 22 Room / Location:  [facility] DELIV[facility]   [facility]    Anesthesia Start: 08 Anesthesia Stop:     Procedure:  SECTION REPEAT (Abdomen) Diagnosis:       History of       (History of  [Z98.891])    Surgeons: Jhon Palacios MD Provider: Saúl Teague DO    Anesthesia Type: spinal ASA Status: 2          Anesthesia Type: spinal    Vitals  Vitals Value Taken Time   /77 22 1230   Temp 36.3 °C (97.4 °F) 22 1230   Pulse 57 22 1230   Resp 16 22 1230   SpO2 98 % 22 1230           Post Anesthesia Care and Evaluation    Patient location during evaluation: bedside  Patient participation: complete - patient participated  Level of consciousness: awake and alert  Pain management: adequate    Airway patency: patent  Anesthetic complications: No anesthetic complications  PONV Status: NA  Cardiovascular status: acceptable and hemodynamically stable  Respiratory status: acceptable  Hydration status: acceptable  Post Neuraxial Block status: No signs or symptoms of PDPH  Comments: /77 (BP Location: Right arm, Patient Position: Lying)   Pulse 57   Temp 36.3 °C (97.4 °F) (Oral)   Resp 16   Ht 167.6 cm (66\")   Wt 74.5 kg (164 lb 3.2 oz)   LMP 2022 (Approximate)   SpO2 98%   Breastfeeding Yes   BMI 26.50 kg/m²       "

## 2022-12-22 NOTE — ANESTHESIA PROCEDURE NOTES
Spinal Block    Pre-sedation assessment completed: 12/22/2022 8:34 AM    Patient reassessed immediately prior to procedure    Patient location during procedure: OR  Start Time: 12/22/2022 8:36 AM  Stop Time: 12/22/2022 8:38 AM  Indication:at surgeon's request  Performed By  Anesthesiologist: Saúl Teague DO  Preanesthetic Checklist  Completed: patient identified, IV checked, site marked, risks and benefits discussed, surgical consent, monitors and equipment checked, pre-op evaluation and timeout performed  Spinal Block Prep:  Patient Position:sitting  Sterile Tech:cap, gloves, sterile barriers and mask  Prep:Chloraprep  Patient Monitoring:EKG, continuous pulse oximetry and blood pressure monitoring    Spinal Block Procedure  Approach:midline  Guidance:landmark technique and palpation technique  Location:L3-L4  Needle Type:Pencan  Needle Gauge:24 G  Placement of Spinal needle event:cerebrospinal fluid aspirated  Paresthesia: no  Fluid Appearance:clear  Medications: fentaNYL citrate (PF) (SUBLIMAZE) injection - Intrathecal, Back   20 mcg - 12/22/2022 8:38:00 AM  Morphine PF injection - Intrathecal, Back   200 mcg - 12/22/2022 8:38:00 AM  bupivacaine PF (MARCAINE) 0.75 % injection - Intrathecal, Back   1.6 mL - 12/22/2022 8:38:00 AM   Post Assessment  Patient Tolerance:patient tolerated the procedure well with no apparent complications  Complications no

## 2022-12-22 NOTE — LACTATION NOTE
This note was copied from a baby's chart.  P2 term baby, 5 hrs old, latching now intermittently to left breast, milk visible. Mom reports breast feeding went well with her first baby and she has personal pump at home.  Reviewed how to know baby is getting enough milk, feeding cues, appropriate amount of output, STS, nurse on demand or every 3hrs and call for any assistance.

## 2022-12-22 NOTE — H&P
History and physical    Admission date 2022     Patient: Suzanne Wade MRN: [id number]   YOB: 1990 Age: 32 y.o. Sex: female     Chief Complaint   Patient presents with   • Scheduled      Repeat c/s. Good fetal movement. Denies LOF/vag bleeding. Denies ctx         HPI:    Suzanne Wade is a 32 y.o.,  AT 39w0d admitted for repeat . Denies vaginal bleeding, leakage of fluid, or contractions. Admits to good fetal movement.     Patient Active Problem List   Diagnosis   • Breech birth   • History of    • S/P repeat low transverse      OB History    Para Term  AB Living   3 1 1   1 1   SAB IAB Ectopic Molar Multiple Live Births   1       0 1      # Outcome Date GA Lbr Gabe/2nd Weight Sex Delivery Anes PTL Lv   3 Current            2 SAB     U SAB   FD   1 Term 19 39w0d  3145 g (6 lb 14.9 oz) F CS-LTranv Spinal N RAYMUNDO     History reviewed. No pertinent past medical history.  Past Surgical History:   Procedure Laterality Date   •  SECTION N/A 3/22/2019    Procedure:  SECTION PRIMARY;  Surgeon: Macey Hayden MD;  Location: [facility] LABOR DELIVERY;  Service: Obstetrics/Gynecology   • SKIN SURGERY      birth escobar removed    • WISDOM TOOTH EXTRACTION       No current facility-administered medications on file prior to encounter.     Current Outpatient Medications on File Prior to Encounter   Medication Sig Dispense Refill   • cholecalciferol (VITAMIN D3) 25 MCG (1000 UT) tablet Take 1,000 Units by mouth Daily.     • clotrimazole-betamethasone (LOTRISONE) 1-0.05 % cream Apply 1 application topically to the appropriate area as directed 2 (Two) Times a Day. Apply to affected area twice daily 7 days 45 g 1   • Prenatal Vit-Fe Fumarate-FA (PRENATAL 27) 27-1 MG tablet tablet Take 1 tablet by mouth Daily.         ROS:      Except as outlined in history of physical illness, patient denies any changes in her GYN, ,  GI systems. All other systems reviewed are negative.      OBJECTIVE:     Vitals:   Vitals:    22 0633 22 0643 22 0646 22 0700   BP:  112/82 105/73 123/74   Pulse:  67 64 58   Resp:    16   Temp:    98.6 °F (37 °C)   TempSrc:    Oral   SpO2:    99%   Weight: 74.5 kg (164 lb 3.2 oz)   74.5 kg (164 lb 3.2 oz)   Height:    167.6 cm (66\")         Appearance/Psychiatric: In no distress   Constitutional: The patient is well nourished   Cardiovascular: She does not have edema. Heart RRR  Respiratory: Respiratory effort is normal. CTAB   Abdomen: Soft, gravid.  Ext: nontender, no edema. +2/4 bilateral patellar reflexes   Cx; deferred       LOS: 0 days    Jhon Palacios MD   2022    Assessment and Plan:   History of  [Z98.891]  S/P repeat low transverse  [Z98.891]  Term intrauterine pregnancy  Desirous of repeat low-transverse  section  Risk benefits potential complications reviewed.  Anesthesia has already seen and informed patient.

## 2022-12-22 NOTE — L&D DELIVERY NOTE
SECTION FULL OPERATIVE REPORT  Pre-operative Diagnosis: Term intrauterine pregnancy with history of     Post-operative Diagnosis: Same, vidya breech presentation  Procedure: Repeat low transverse  Section  Anesthesia: Spinal  Surgeon(s): EVER Palacios  Assistant(s):   Assistant: Rik Landry MD  was responsible for performing the following activities: Retraction, Suction, Irrigation, Suturing, Closing and Delivery of Fetus and their skilled assistance was necessary for the success of this case.  Gestational age 39 weeks  Active Hospital Problems    Diagnosis  POA   • **History of  [Z98.891]  Not Applicable     Secondary to breech     • S/P repeat low transverse  [Z98.891]  Not Applicable   • Breech presentation delivered [O32.1XX0]  Unknown     Floating vidya breech presentation     • History of postpartum depression [Z87.59, Z86.59]  Not Applicable     Infant    Findings: male  infant     Weight: 3470 g (7 lb 10.4 oz)   Length: 20  in  Observations/Comments:  Panda in OR1      8  @ 1 minute /   9  @ 5 minutes          Complications: none  Specimens: Placenta  EBL: See epic for quantitative blood loss      Description of Procedure:After reviewing risks,benefits, and possible complications, the patient was taken to the operating room where anesthesia was found to be adequate. She was prepped and draped in the usual sterile fashion in the dorsal supine position with a leftward tilt.   A Pfannenstiel skin incision was made with the scalpel and carried through the fat to the underlying layer of fascia. The fascia was then incised in the midline and the incision was extended laterally with Jones scissors. The superior aspect of the fascia was then grasped with Kocher clamps and the underlying rectus muscles were then dissected off bluntly and  with the Jones scissors. The inferior aspect of the fascia was then grasped with Kocher clamps and dissected off similarly with Jones  scissors. The rectus muscles were  and the peritoneum  entered. The peritoneal incision was then carried superiorly and inferiorly taking care to identify the bladder at the lower aspect.   The bladder blade was inserted. The vesicouterine peritoneum was then identified and entered sharply with Metzenbaum scissors and a bladder flap was created.. The bladder blade was reinserted and the uterine incision was made down to the chorionic laeve. This was extended with the bandage scissors. Membranes were then ruptured with an Allis clamp.  The bladder blade was removed, infant was breech presentation, both feet were grasped and infant was delivered up to the shoulders, left and right arm were delivered without difficulty.  Head was then placed in a flexed position and delivered without problem.   . The infant was moving all four extremities.The nose and mouth were suctioned and the cord was clamped and cut. The infant was taken to the warmer for the   staff, who were present via hospital policy. Cord blood was collected. The placenta was removed, and the uterus was cleared of all clots and debris. The uterine incision was repaired in two layers using 0 chromic suture. The uterus was examined and noted to be hemostatic.  The posterior cul-de-sac and gutters were cleared of all clots and debris. The uterus,tubes and ovaries appeared normal. The uterine incision was  then re-inspected to ensure hemostasis as were all subfascial tissues. Vesic-uterine peritoneum was closed with a 3-0 vicryl suture. All counts were correct and parietal peritoneum was closed with a 3-0 vicryl suture.The fascia was re-approximated in a running fashion using 0-vicryl suture. Three interrupted 0 vicryl sutures were placed laterally and in the midline for reinforcement. The subcutaneous tissue was irrigated, a small bleeding spot on the right lower subcutaneous tissue was bovied and a 3-0 chromic suture was placed around that  space to ensure hemostasis.  Then 3-0 Vicryl was used to re-approximated in a running fashion with 3-0 vicryl. The skin was closed with 4-0 vicryl. Bandages were applied. Sánchez was draining clear urine.    The patient tolerated the procedure well. Sponge, lap and needle counts were correct. The patient was taken to recovery in stable condition.  A reminder call was placed to the nursery to do discussed transient finding of infants renal pelvic minimal dilatation.  Jhon Palacios MD    12/22/2022  09:46 EST

## 2022-12-22 NOTE — PROGRESS NOTES
Called to the OR to evaluate pt due to incision issue. On arrival, the left half of skin incision was open. There was slight oozing from the right suture line. The suture was removed from the right aspect of skin and subcutaneous tissue was evaluated. 2 small areas of oozing in the subcutaneous tissue was made hemostatic with cautery. The subcutaneous tissue was irrigated and hemostasis was confirmed. The skin was then closed with 4-0 monocryl. The incision was observed and hemostatic. A sterile dressing was then placed. Patient was comfortable and stable at time of skin closure.

## 2022-12-22 NOTE — ANESTHESIA PREPROCEDURE EVALUATION
Anesthesia Evaluation     Patient summary reviewed   no history of anesthetic complications:  NPO Solid Status: > 8 hours  NPO Liquid Status: > 2 hours           Airway   Mallampati: II  TM distance: >3 FB  Neck ROM: full  No difficulty expected  Dental - normal exam     Pulmonary     breath sounds clear to auscultation  (-) asthma, shortness of breath, recent URI  Cardiovascular   Exercise tolerance: good (4-7 METS)    Rhythm: regular  Rate: normal    (-) past MI, dysrhythmias, angina      Neuro/Psych  (-) seizures, CVA  GI/Hepatic/Renal/Endo    (+)  GERD,    (-) liver disease, no renal disease, diabetes    Musculoskeletal     (-) neck stiffness  Abdominal    Substance History      OB/GYN    (+) Pregnant,   (-) Preeclampsia        Other        (-) blood dyscrasia                    Anesthesia Plan    ASA 2     spinal     (IUP 39w0d)    Anesthetic plan, risks, benefits, and alternatives have been provided, discussed and informed consent has been obtained with: patient.    Use of blood products discussed with patient  Consented to blood products.   Plan discussed with CRNA.

## 2022-12-22 NOTE — PLAN OF CARE
Goal Outcome Evaluation:  Plan of Care Reviewed With: patient, spouse        Progress: improving  Outcome Evaluation: Pt s/p  delivery of baby boy. Pt denies pain at this time. Fundal checks WNL. Mom and baby in JAMI and BF. Will transfer to mother baby at end of recovery.

## 2022-12-23 LAB
BASOPHILS # BLD AUTO: 0.03 10*3/MM3 (ref 0–0.2)
BASOPHILS NFR BLD AUTO: 0.3 % (ref 0–1.5)
DEPRECATED RDW RBC AUTO: 40.4 FL (ref 37–54)
EOSINOPHIL # BLD AUTO: 0.05 10*3/MM3 (ref 0–0.4)
EOSINOPHIL NFR BLD AUTO: 0.5 % (ref 0.3–6.2)
ERYTHROCYTE [DISTWIDTH] IN BLOOD BY AUTOMATED COUNT: 12 % (ref 12.3–15.4)
HCT VFR BLD AUTO: 28.4 % (ref 34–46.6)
HGB BLD-MCNC: 9.5 G/DL (ref 12–15.9)
IMM GRANULOCYTES # BLD AUTO: 0.03 10*3/MM3 (ref 0–0.05)
IMM GRANULOCYTES NFR BLD AUTO: 0.3 % (ref 0–0.5)
LYMPHOCYTES # BLD AUTO: 1.45 10*3/MM3 (ref 0.7–3.1)
LYMPHOCYTES NFR BLD AUTO: 14.2 % (ref 19.6–45.3)
MCH RBC QN AUTO: 30.9 PG (ref 26.6–33)
MCHC RBC AUTO-ENTMCNC: 33.5 G/DL (ref 31.5–35.7)
MCV RBC AUTO: 92.5 FL (ref 79–97)
MONOCYTES # BLD AUTO: 0.77 10*3/MM3 (ref 0.1–0.9)
MONOCYTES NFR BLD AUTO: 7.6 % (ref 5–12)
NEUTROPHILS NFR BLD AUTO: 7.85 10*3/MM3 (ref 1.7–7)
NEUTROPHILS NFR BLD AUTO: 77.1 % (ref 42.7–76)
NRBC BLD AUTO-RTO: 0 /100 WBC (ref 0–0.2)
PLATELET # BLD AUTO: 159 10*3/MM3 (ref 140–450)
PMV BLD AUTO: 11.1 FL (ref 6–12)
RBC # BLD AUTO: 3.07 10*6/MM3 (ref 3.77–5.28)
WBC NRBC COR # BLD: 10.18 10*3/MM3 (ref 3.4–10.8)

## 2022-12-23 PROCEDURE — 85025 COMPLETE CBC W/AUTO DIFF WBC: CPT | Performed by: OBSTETRICS & GYNECOLOGY

## 2022-12-23 PROCEDURE — 25010000002 KETOROLAC TROMETHAMINE PER 15 MG: Performed by: OBSTETRICS & GYNECOLOGY

## 2022-12-23 PROCEDURE — 0503F POSTPARTUM CARE VISIT: CPT | Performed by: OBSTETRICS & GYNECOLOGY

## 2022-12-23 RX ADMIN — ACETAMINOPHEN 1000 MG: 500 TABLET, FILM COATED ORAL at 02:47

## 2022-12-23 RX ADMIN — OXYCODONE HYDROCHLORIDE 10 MG: 10 TABLET ORAL at 18:32

## 2022-12-23 RX ADMIN — ACETAMINOPHEN 650 MG: 325 TABLET, FILM COATED ORAL at 22:29

## 2022-12-23 RX ADMIN — SIMETHICONE 80 MG: 80 TABLET, CHEWABLE ORAL at 20:20

## 2022-12-23 RX ADMIN — ACETAMINOPHEN 1000 MG: 500 TABLET, FILM COATED ORAL at 08:02

## 2022-12-23 RX ADMIN — FLUCONAZOLE 100 MG: 100 TABLET ORAL at 13:15

## 2022-12-23 RX ADMIN — SERTRALINE HYDROCHLORIDE 50 MG: 50 TABLET, FILM COATED ORAL at 10:28

## 2022-12-23 RX ADMIN — OXYCODONE HYDROCHLORIDE 10 MG: 10 TABLET ORAL at 14:28

## 2022-12-23 RX ADMIN — ACETAMINOPHEN 650 MG: 325 TABLET, FILM COATED ORAL at 16:18

## 2022-12-23 RX ADMIN — OXYCODONE HYDROCHLORIDE 10 MG: 10 TABLET ORAL at 22:29

## 2022-12-23 RX ADMIN — SIMETHICONE 80 MG: 80 TABLET, CHEWABLE ORAL at 08:06

## 2022-12-23 RX ADMIN — KETOROLAC TROMETHAMINE 15 MG: 15 INJECTION, SOLUTION INTRAMUSCULAR; INTRAVENOUS at 13:15

## 2022-12-23 RX ADMIN — IBUPROFEN 800 MG: 800 TABLET, FILM COATED ORAL at 20:20

## 2022-12-23 RX ADMIN — KETOROLAC TROMETHAMINE 15 MG: 15 INJECTION, SOLUTION INTRAMUSCULAR; INTRAVENOUS at 06:26

## 2022-12-23 RX ADMIN — KETOROLAC TROMETHAMINE 15 MG: 15 INJECTION, SOLUTION INTRAMUSCULAR; INTRAVENOUS at 00:45

## 2022-12-23 RX ADMIN — SIMETHICONE 80 MG: 80 TABLET, CHEWABLE ORAL at 14:29

## 2022-12-23 RX ADMIN — OXYCODONE 5 MG: 5 TABLET ORAL at 08:06

## 2022-12-23 NOTE — LACTATION NOTE
This note was copied from a baby's chart.  P2 T - mom with questions.  She says she has an inverted nipple on the right side that baby isn't latching onto.  She wants to know if she should be pumping.      Suggested she use hand pump to pull nipple out before latching to right side.  If that does not help then nipple shield is another option.  Hand pump, syringes, med cups, basin, soap & 24mm nipple shield given.  Reviewed pump use/cleaning & nipple shield application/ cleaning as mom used these BF'g tools before with her 1st baby who is now 4yo.      Mom latched baby to right side with nipple shield in cradle hold.  Baby observed to be not actively sucking & falling away from breast.  Suggested repositioning to cross-cradle hold to give baby more upper back & head support as well as bringing lower arm around mom's side instead of between baby & the breast.  Verbalized understanding.    Encouraged to call LC's when needing help or with questions.  Verbalized understanding.  LC #'s on WB.

## 2022-12-23 NOTE — LACTATION NOTE
This note was copied from a baby's chart.  P2 T - rounded on mom, she is resting.  LC #'s on WB.  Will check in with her when she's awake or when she calls.

## 2022-12-23 NOTE — PROGRESS NOTES
[facility]   PROGRESS NOTE    Post-Op Day 1 S/P     Subjective   CC: post  section    Patient reports:  The patient reports she is having some right shoulder pain.  She just took some pain medication and is planning to use the heating pad.  Pain is controlled with Toradol and Percocet.  She is passing gas.  She is voiding and tolerating regular diet.  She is ambulating without assistance.  She desires circumcision for her son.      Review of systems- no shortness of breath, nausea or vomiting or leg pain.    Objective      Vitals: Vital Signs Range for the last 24 hours  Temperature: Temp:  [97.4 °F (36.3 °C)-98.2 °F (36.8 °C)] 97.4 °F (36.3 °C)       BP: BP: (103-131)/(56-82) 115/74   Pulse: Heart Rate:  [52-71] 64   Respirations: Resp:  [16-18] 16                            Physical exam   General-  no acute distress, conversant    Lungs- respirations unlabored   CV- trace LE edema   Abdomen- soft, nontender, nondistended.  Fundus is firm.   Incision -dressing is dry   Lower extremities- nontender bilaterally    Results from last 7 days   Lab Units 22  0657   WBC 10*3/mm3 7.49   HEMOGLOBIN g/dL 11.4*   HEMATOCRIT % 34.5   PLATELETS 10*3/mm3 180         Assessment & Plan        History of     S/P repeat low transverse     Breech presentation delivered    History of postpartum depression      Assessment:    Suzanne Wade is Day 1  post-op from      Discuss shoulder pain is likely due to peritoneal irritation.  Patient desire circumcision for her son.  We discussed this is an elective procedure with a small risk of bleeding, infection or injury to the penis.  Parents wish to proceed.     Plan:  continue post op care, pain medication prn and encouraged ambulation.        Durga Guzman MD  2022  09:35 EST

## 2022-12-23 NOTE — PLAN OF CARE
Goal Outcome Evaluation:  Plan of Care Reviewed With: patient           Outcome Evaluation: VSS. pt up ad mychal, abulated unit. Given Prabhakar 5 x1 and prabhakar 10 x1 for increased pain. urine output adequate. SCDs applied.

## 2022-12-24 VITALS
BODY MASS INDEX: 26.39 KG/M2 | SYSTOLIC BLOOD PRESSURE: 121 MMHG | DIASTOLIC BLOOD PRESSURE: 80 MMHG | OXYGEN SATURATION: 100 % | TEMPERATURE: 98.2 F | HEART RATE: 56 BPM | RESPIRATION RATE: 16 BRPM | HEIGHT: 66 IN | WEIGHT: 164.2 LBS

## 2022-12-24 PROCEDURE — 0503F POSTPARTUM CARE VISIT: CPT | Performed by: OBSTETRICS & GYNECOLOGY

## 2022-12-24 RX ORDER — OXYCODONE HYDROCHLORIDE 5 MG/1
5 TABLET ORAL EVERY 6 HOURS PRN
Qty: 30 TABLET | Refills: 0 | Status: SHIPPED | OUTPATIENT
Start: 2022-12-24 | End: 2022-12-31

## 2022-12-24 RX ORDER — SIMETHICONE 80 MG
80 TABLET,CHEWABLE ORAL 4 TIMES DAILY PRN
Qty: 60 TABLET | Refills: 0 | Status: SHIPPED | OUTPATIENT
Start: 2022-12-24 | End: 2023-03-28

## 2022-12-24 RX ORDER — AMOXICILLIN 250 MG
2 CAPSULE ORAL DAILY
Qty: 60 TABLET | Refills: 0 | Status: SHIPPED | OUTPATIENT
Start: 2022-12-24 | End: 2023-03-28

## 2022-12-24 RX ORDER — IBUPROFEN 800 MG/1
800 TABLET ORAL EVERY 8 HOURS SCHEDULED
Qty: 30 TABLET | Refills: 1 | Status: SHIPPED | OUTPATIENT
Start: 2022-12-24 | End: 2023-03-28

## 2022-12-24 RX ADMIN — ACETAMINOPHEN 650 MG: 325 TABLET, FILM COATED ORAL at 02:47

## 2022-12-24 RX ADMIN — SERTRALINE HYDROCHLORIDE 50 MG: 50 TABLET, FILM COATED ORAL at 07:36

## 2022-12-24 RX ADMIN — IBUPROFEN 800 MG: 800 TABLET, FILM COATED ORAL at 06:14

## 2022-12-24 RX ADMIN — ACETAMINOPHEN 650 MG: 325 TABLET, FILM COATED ORAL at 12:41

## 2022-12-24 RX ADMIN — SIMETHICONE 80 MG: 80 TABLET, CHEWABLE ORAL at 02:47

## 2022-12-24 NOTE — DISCHARGE SUMMARY
[facility]   Obstetrics and Gynecology    Section Discharge Summary    Date of Admission: 2022  Date of Discharge:        Patient: Suzanne Wade      MR#:[id number]    Surgeon/OB: Jhon Palacios MD    Discharge Diagnosis:    section at 39w0d, uncomplicated recovery    History of     S/P repeat low transverse     Breech presentation delivered    History of postpartum depression        Procedures:  , Low Transverse     2022    8:58 AM      Anesthesia:  Spinal     Presenting Problem/History of Present Illness  History of  [Z98.891]  S/P repeat low transverse  [Z98.891]     Patient Active Problem List   Diagnosis   • Breech birth   • History of    • S/P repeat low transverse    • Breech presentation delivered   • History of postpartum depression       Hospital Course  Patient is a 32 y.o. female  at 39w0d status post  section with uneventful postoperative recovery.  Patient was advanced to regular diet on postoperative day#1.  On discharge, ambulating, tolerating a regular diet without any difficulties and her incision is dry, clean and intact.     Infant:   male  fetus 3470 g (7 lb 10.4 oz)  with Apgar scores of 8 , 9  at five minutes.    Condition on Discharge:  Stable    Vital Signs  Temp:  [97.6 °F (36.4 °C)-98.2 °F (36.8 °C)] 98.2 °F (36.8 °C)  Heart Rate:  [52-57] 56  Resp:  [16] 16  BP: (113-121)/(66-80) 121/80    Results from last 7 days   Lab Units 22  0911 22  0657   WBC 10*3/mm3 10.18 7.49   HEMOGLOBIN g/dL 9.5* 11.4*   HEMATOCRIT % 28.4* 34.5   PLATELETS 10*3/mm3 159 180             Discharge Disposition  Home or Self Care    Discharge Medications     Your medication list      START taking these medications      Instructions Last Dose Given Next Dose Due   ibuprofen 800 MG tablet  Commonly known as: ADVIL,MOTRIN      Take 1 tablet by mouth Every 8 (Eight) Hours.        oxyCODONE 5 MG immediate release tablet  Commonly known as: ROXICODONE      Take 1 tablet by mouth Every 6 (Six) Hours As Needed for Moderate Pain for up to 5 days.       sennosides-docusate 8.6-50 MG per tablet  Commonly known as: PERICOLACE      Take 2 tablets by mouth Daily.       sertraline 50 MG tablet  Commonly known as: ZOLOFT      Take 1 tablet by mouth Daily.       simethicone 80 MG chewable tablet  Commonly known as: MYLICON      Chew 1 tablet 4 (Four) Times a Day As Needed for Flatulence.          CONTINUE taking these medications      Instructions Last Dose Given Next Dose Due   carbonyl iron 45 MG tablet tablet  Commonly known as: FEOSOL      Take  by mouth Every Other Day.       cholecalciferol 25 MCG (1000 UT) tablet  Commonly known as: VITAMIN D3      Take 1,000 Units by mouth Daily.       clotrimazole-betamethasone 1-0.05 % cream  Commonly known as: LOTRISONE      Apply 1 application topically to the appropriate area as directed 2 (Two) Times a Day. Apply to affected area twice daily 7 days       Prenatal 27-1 27-1 MG tablet tablet      Take 1 tablet by mouth Daily.          STOP taking these medications    Monistat 7 Complete Therapy 100-2 MG-% kit              Where to Get Your Medications      These medications were sent to [place] [id number]    Hours: 7:00 AM-6:00 PM Mon-Fri, 8:00 AM-4:30 PM Sat-Sun (Closed 12-12:30PM) Phone: [phone]   · ibuprofen 800 MG tablet  · oxyCODONE 5 MG immediate release tablet  · sennosides-docusate 8.6-50 MG per tablet  · sertraline 50 MG tablet  · simethicone 80 MG chewable tablet           Discharge Diet: Regular    Follow-up Appointments  No future appointments.  Additional Instructions for the Follow-ups that You Need to Schedule     Call MD for problems / concerns.   As directed      Instructions: Call for temp>101, vaginal bleeding greater than one pad/hour, severe pain or other concerns.    Order  Comments: Instructions: Call for temp>101, vaginal bleeding greater than one pad/hour, severe pain or other concerns.          Discharge Follow-up with Specialty: Dr. Palacios; 2 Weeks   As directed      Specialty: Dr. Palacios    Follow Up: 2 Weeks               Prenatal labs/vax:   Immunization History   Administered Date(s) Administered   • FluLaval/Fluzone >6mos 10/05/2022   • Influenza, Unspecified 10/01/2021, 10/05/2022   • Tdap 12/11/2019, 10/05/2022       External Prenatal Results     Pregnancy Outside Results - Transcribed From Office Records - See Scanned Records For Details     Test Value Date Time    ABO  B  12/22/22 0657    Rh  Positive  12/22/22 0657    Antibody Screen  Negative  12/22/22 0657       Negative  05/03/22 1553    Varicella IgG       Rubella  1.07 index 05/03/22 1553    Hgb  9.5 g/dL 12/23/22 0911       11.4 g/dL 12/22/22 0657       11.4 g/dL 10/05/22 1107       12.7 g/dL 05/03/22 1553    Hct  28.4 % 12/23/22 0911       34.5 % 12/22/22 0657       38.7 % 05/03/22 1553    Glucose Fasting GTT       Glucose Tolerance Test 1 hour       Glucose Tolerance Test 3 hour       Gonorrhea (discrete)       Chlamydia (discrete)       RPR  Non Reactive  05/03/22 1553    VDRL       Syphilis Antibody       HBsAg  Negative  05/03/22 1553    Herpes Simplex Virus PCR       Herpes Simplex VIrus Culture       HIV  Non Reactive  05/03/22 1553    Hep C RNA Quant PCR       Hep C Antibody  <0.1 s/co ratio 05/03/22 1553    AFP       Group B Strep  Negative  11/30/22 1151    GBS Susceptibility to Clindamycin       GBS Susceptibility to Erythromycin       Fetal Fibronectin       Genetic Testing, Maternal Blood             Drug Screening     Test Value Date Time    Urine Drug Screen       Amphetamine Screen       Barbiturate Screen       Benzodiazepine Screen       Methadone Screen       Phencyclidine Screen       Opiates Screen       THC Screen       Cocaine Screen       Propoxyphene Screen       Buprenorphine Screen        Methamphetamine Screen       Oxycodone Screen       Tricyclic Antidepressants Screen             Legend    ^: Historical                          Isis Hairston MD  12/24/2022  09:57 EST

## 2022-12-24 NOTE — LACTATION NOTE
Patient anticipates discharge today and had a few questions. Baby has just nursed very well per mom and she did not have to use the nipple shield . Reviewed proper use and cleaning of nipple shield. Mom feels milk is coming in . Has hx of plugged ducts so reviewed new treatment protocols and enc her to call LC services as needed. Contact numbers given .  Lactation Consult Note    Evaluation Completed: 2022 10:59 EST  Patient Name: Suzanne Wade  :  1990  MRN:  [id number]     REFERRAL  INFORMATION:                          Date of Referral: 22   Person Making Referral: nurse  Maternal Reason for Referral: latch difficulty  Infant Reason for Referral: sleepy    DELIVERY HISTORY:        Skin to skin initiation date/time:      Skin to skin end date/time:           MATERNAL ASSESSMENT:  Breast Size Issue: none (22 1000)  Breast Shape: round (22 1000)  Breast Density: filling (22 1000)     Nipples: Right:, inverted (22 1000)                INFANT ASSESSMENT:  Information for the patient's :  Gerardo Wade [[id number]]   No past medical history on file.                                                                                                     MATERNAL INFANT FEEDING:     Maternal Emotional State: receptive (22 1000)         Pain with Feeding: no (22 1000)           Milk Ejection Reflex: present (22 1000)                                           EQUIPMENT TYPE:                                 BREAST PUMPING:          LACTATION REFERRALS:

## 2022-12-24 NOTE — PROGRESS NOTES
[facility]   Obstetrics and Gynecology     2022    Name:Suzanne Wade    MR#:[id number]     Progress Note:  Post-Op    HD:2    Subjective   32 y.o. yo Female  s/p CS at 39w0d doing well. Pain well controlled. Tolerating regular diet and having flatus. Lochia normal.     Patient Active Problem List   Diagnosis   • Breech birth   • History of    • S/P repeat low transverse    • Breech presentation delivered   • History of postpartum depression        Objective    Vitals  Temp:  Temp:  [97.6 °F (36.4 °C)-98.2 °F (36.8 °C)] 98.2 °F (36.8 °C)  Temp src: Oral  BP:  BP: (113-121)/(66-80) 121/80  Pulse:  Heart Rate:  [52-57] 56  RR:   Resp:  [16] 16  Weight: 74.5 kg (164 lb 3.2 oz)  BMI:  Body mass index is 26.5 kg/m².    Physical Exam  Vitals and nursing note reviewed.   Constitutional:       Appearance: Normal appearance. She is normal weight.   Pulmonary:      Effort: Pulmonary effort is normal.   Abdominal:      General: There is no distension.      Palpations: Abdomen is soft.      Tenderness: There is no abdominal tenderness.      Comments: Inc C/D/I    Neurological:      Mental Status: She is alert and oriented to person, place, and time.   Psychiatric:         Mood and Affect: Mood normal.         Behavior: Behavior normal.         I/O last 3 completed shifts:  In: 800 [P.O.:800]  Out: 5400 [Urine:5400]    LABS:  Results from last 7 days   Lab Units 22  0911 22  0657   WBC 10*3/mm3 10.18 7.49   HEMOGLOBIN g/dL 9.5* 11.4*   HEMATOCRIT % 28.4* 34.5   PLATELETS 10*3/mm3 159 180           Infant: male       Assessment    1.  POD#2     History of     S/P repeat low transverse     Breech presentation delivered    History of postpartum depression      Plan:  Discharge today     Isis Hairston MD  2022 09:52 EST

## 2022-12-24 NOTE — PLAN OF CARE
Problem: Adult Inpatient Plan of Care  Goal: Plan of Care Review  Outcome: Met  Flowsheets (Taken 2022 0750)  Progress: improving  Plan of Care Reviewed With:   patient   spouse  Outcome Evaluation: pt vss, pain well controlled with motrin, tylenol and prabhakar. ambulating well. discharge home today.  Goal: Patient-Specific Goal (Individualized)  Outcome: Met  Goal: Absence of Hospital-Acquired Illness or Injury  Outcome: Met  Intervention: Identify and Manage Fall Risk  Recent Flowsheet Documentation  Taken 2022 by Maylin Latham RN  Safety Promotion/Fall Prevention: safety round/check completed  Intervention: Prevent and Manage VTE (Venous Thromboembolism) Risk  Recent Flowsheet Documentation  Taken 2022 by Maylin Latham RN  Activity Management:   activity adjusted per tolerance   activity encouraged   up ad mychal  Goal: Optimal Comfort and Wellbeing  Outcome: Met  Intervention: Provide Person-Centered Care  Recent Flowsheet Documentation  Taken 2022 by Maylin Latham RN  Trust Relationship/Rapport:   care explained   choices provided   emotional support provided   empathic listening provided   questions answered   questions encouraged   reassurance provided   thoughts/feelings acknowledged  Goal: Readiness for Transition of Care  Outcome: Met     Problem:  Fall Injury Risk  Goal: Absence of Fall, Infant Drop and Related Injury  Outcome: Met  Intervention: Promote Injury-Free Environment  Recent Flowsheet Documentation  Taken 2022 by Maylin Latham RN  Safety Promotion/Fall Prevention: safety round/check completed     Problem: Skin Injury Risk Increased  Goal: Skin Health and Integrity  Outcome: Met     Problem: Bleeding ( Delivery)  Goal: Bleeding is Controlled  Outcome: Met     Problem: Change in Fetal Wellbeing ( Delivery)  Goal: Stable Fetal Wellbeing  Outcome: Met     Problem: Infection ( Delivery)  Goal: Absence  of Infection Signs and Symptoms  Outcome: Met     Problem: Respiratory Compromise ( Delivery)  Goal: Effective Oxygenation and Ventilation  Outcome: Met     Problem: Device-Related Complication Risk (Anesthesia/Analgesia, Neuraxial)  Goal: Safe Infusion Delivery Completion  Outcome: Met     Problem: Infection (Anesthesia/Analgesia, Neuraxial)  Goal: Absence of Infection Signs and Symptoms  Outcome: Met     Problem: Nausea and Vomiting (Anesthesia/Analgesia, Neuraxial)  Goal: Nausea and Vomiting Relief  Outcome: Met     Problem: Pain (Anesthesia/Analgesia, Neuraxial)  Goal: Effective Pain Control  Outcome: Met     Problem: Respiratory Compromise (Anesthesia/Analgesia, Neuraxial)  Goal: Effective Oxygenation and Ventilation  Outcome: Met     Problem: Sensorimotor Impairment (Anesthesia/Analgesia, Neuraxial)  Goal: Baseline Motor Function  Outcome: Met  Intervention: Optimize Sensorimotor Function  Recent Flowsheet Documentation  Taken 2022 4096 by Maylin Latham RN  Safety Promotion/Fall Prevention: safety round/check completed     Problem: Urinary Retention (Anesthesia/Analgesia, Neuraxial)  Goal: Effective Urinary Elimination  Outcome: Met   Goal Outcome Evaluation:  Plan of Care Reviewed With: patient, spouse        Progress: improving  Outcome Evaluation: pt vss, pain well controlled with motrin, tylenol and prabhakar. ambulating well. discharge home today.

## 2022-12-28 ENCOUNTER — TELEPHONE (OUTPATIENT)
Dept: OBSTETRICS AND GYNECOLOGY | Age: 32
End: 2022-12-28

## 2022-12-28 NOTE — TELEPHONE ENCOUNTER
Pt called to schedule 2wk pp ck. Pt scheduled 1/3. But pt reports that the last 2 days around 6p she gets lightheaded, dizzy, headaches, and her  reports her looking very pale. She has not checked her BP when this happens but advised to do so if it happens tonight. Please advise if you suggest anything else or would like pt to be seen.

## 2023-01-03 ENCOUNTER — POSTPARTUM VISIT (OUTPATIENT)
Dept: OBSTETRICS AND GYNECOLOGY | Age: 33
End: 2023-01-03
Payer: COMMERCIAL

## 2023-01-03 VITALS
BODY MASS INDEX: 22.66 KG/M2 | DIASTOLIC BLOOD PRESSURE: 72 MMHG | SYSTOLIC BLOOD PRESSURE: 112 MMHG | HEIGHT: 66 IN | WEIGHT: 141 LBS

## 2023-01-03 DIAGNOSIS — Z86.59 HISTORY OF POSTPARTUM DEPRESSION: ICD-10-CM

## 2023-01-03 DIAGNOSIS — Z98.891 S/P REPEAT LOW TRANSVERSE C-SECTION: ICD-10-CM

## 2023-01-03 DIAGNOSIS — Z98.890 POST-OPERATIVE STATE: Primary | ICD-10-CM

## 2023-01-03 DIAGNOSIS — Z87.59 HISTORY OF POSTPARTUM DEPRESSION: ICD-10-CM

## 2023-01-03 PROCEDURE — 0503F POSTPARTUM CARE VISIT: CPT | Performed by: OBSTETRICS & GYNECOLOGY

## 2023-01-03 NOTE — PROGRESS NOTES
Subjective       History of Present Illness  Suzanne Wade is a 32 y.o. female is being seen today for postoperative visit following her repeat  on  of a 7 pound 10 ounce male infant  Chief Complaint   Patient presents with   • Postpartum Follow-up     2 week PP Check, , baby boy, 7lbs 10.4oz, \"Miles\", breast feeding,  Last pap 5/3/2022 (-), HPV (-)   .        The following portions of the patient's history were reviewed and updated as appropriate: allergies, current medications, past family history, past medical history, past social history, past surgical history and problem list.    PAST MEDICAL HISTORY  History reviewed. No pertinent past medical history.  OB History    Para Term  AB Living   3 2 2   1 2   SAB IAB Ectopic Molar Multiple Live Births   1       0 2      # Outcome Date GA Lbr Gabe/2nd Weight Sex Delivery Anes PTL Lv   3 Term 22 39w0d  3470 g (7 lb 10.4 oz) M CS-LTranv Spinal N RAYMUNDO      Birth Comments: Panda in OR1   2 SAB     U SAB   FD   1 Term 19 39w0d  3145 g (6 lb 14.9 oz) F CS-LTranv Spinal N RAYMUNDO     Past Surgical History:   Procedure Laterality Date   •  SECTION N/A 3/22/2019    Procedure:  SECTION PRIMARY;  Surgeon: Macey Hayden MD;  Location:  [facility];  Service: Obstetrics/Gynecology   •  SECTION N/A 2022    Procedure:  SECTION REPEAT;  Surgeon: Jhon Palacios MD;  Location: [facility] DELIVERY;  Service: Obstetrics/Gynecology;  Laterality: N/A;   • SKIN SURGERY      birth escobar removed    • WISDOM TOOTH EXTRACTION       History reviewed. No pertinent family history.  Social History     Tobacco Use   Smoking Status Never   Smokeless Tobacco Never       Current Outpatient Medications:   •  carbonyl iron (FEOSOL) 45 MG tablet tablet, Take  by mouth Every Other Day., Disp: , Rfl:   •  cholecalciferol (VITAMIN D3) 25 MCG (1000 UT) tablet, Take 1,000 Units by mouth Daily.,  Disp: , Rfl:   •  ibuprofen (ADVIL,MOTRIN) 800 MG tablet, Take 1 tablet by mouth Every 8 (Eight) Hours., Disp: 30 tablet, Rfl: 1  •  Prenatal Vit-Fe Fumarate-FA (PRENATAL 27-) 27-1 MG tablet tablet, Take 1 tablet by mouth Daily., Disp: , Rfl:   •  sennosides-docusate (senna-docusate sodium) 8.6-50 MG per tablet, Take 2 tablets by mouth Daily., Disp: 60 tablet, Rfl: 0  •  sertraline (ZOLOFT) 50 MG tablet, Take 1 tablet by mouth Daily., Disp: 90 tablet, Rfl: 3  •  clotrimazole-betamethasone (LOTRISONE) 1-0.05 % cream, Apply 1 application topically to the appropriate area as directed 2 (Two) Times a Day. Apply to affected area twice daily 7 days, Disp: 45 g, Rfl: 1  •  simethicone (MYLICON) 80 MG chewable tablet, Chew 1 tablet 4 (Four) Times a Day As Needed for Flatulence., Disp: 60 tablet, Rfl: 0  Immunization History   Administered Date(s) Administered   • FluLaval/Fluzone >6mos 10/05/2022   • Influenza, Unspecified 10/01/2021, 10/05/2022   • Tdap 2019, 10/05/2022       Review of Systems       Except as outlined in history of physical illness, patient denies any changes in her GYN, , GI systems. All other systems reviewed are negative.    Objective   Physical Exam   Alert and oriented, respirations unlabored, heart regular rate and rhythm   Pelvic declined  Incision is clean dry intact, Steri-Strips were removed.  Rectus muscles are very pronounced and very small diastasis.  No erythema no discharge.      Assessment & Plan   Diagnoses and all orders for this visit:    1. Post-operative state (Primary)    2. S/P repeat low transverse     3. History of postpartum depression    4. Breech presentation delivered    Patient appears to be doing quite well, physical exam quite reassuring, I had discussed small amount of fluid on renal pelvis with  With neonatology.  Patient is taking her Zoloft  Because of the twice daily iron she is on some stool softeners will continue.  Patient will call with any  problems questions concerns             No orders of the defined types were placed in this encounter.          EMR Dragon/ Transcription disclaimer:  Much of the encounter note is an electronic transcription/translation of spoken language to printed text. The electronic translation of spoken language may permit erroneous, or at times, nonessential words or phrases to be inadvertently transcribes; Although i have reviewed the note for such errors, some may still exist.

## 2023-01-26 ENCOUNTER — TELEPHONE (OUTPATIENT)
Dept: LACTATION | Facility: HOSPITAL | Age: 33
End: 2023-01-26
Payer: COMMERCIAL

## 2023-01-31 ENCOUNTER — POSTPARTUM VISIT (OUTPATIENT)
Dept: OBSTETRICS AND GYNECOLOGY | Age: 33
End: 2023-01-31
Payer: COMMERCIAL

## 2023-01-31 VITALS
DIASTOLIC BLOOD PRESSURE: 70 MMHG | HEIGHT: 66 IN | SYSTOLIC BLOOD PRESSURE: 110 MMHG | BODY MASS INDEX: 22.34 KG/M2 | WEIGHT: 139 LBS

## 2023-01-31 DIAGNOSIS — Z98.891 S/P REPEAT LOW TRANSVERSE C-SECTION: ICD-10-CM

## 2023-01-31 DIAGNOSIS — Z87.59 HISTORY OF POSTPARTUM DEPRESSION: Primary | ICD-10-CM

## 2023-01-31 DIAGNOSIS — Z86.59 HISTORY OF POSTPARTUM DEPRESSION: Primary | ICD-10-CM

## 2023-01-31 DIAGNOSIS — M62.89 PELVIC FLOOR INSTABILITY: ICD-10-CM

## 2023-01-31 PROCEDURE — 0503F POSTPARTUM CARE VISIT: CPT | Performed by: OBSTETRICS & GYNECOLOGY

## 2023-01-31 NOTE — PROGRESS NOTES
"Subjective     Chief Complaint   Patient presents with   • Postpartum Care     6wks pp csection, breast feeding, \"Miles\", feeling well, unsure about BC at this time        Baby's name:James Wade is a 32 y.o. female who presents for a postpartum visit. She is 6 weeks postpartum following a low cervical transverse  section. I have fully reviewed the prenatal and intrapartum course. Postpartum course has been uneventful. Baby is feeding by breast. Bleeding has been normal in amount and decreasing.. Bowel function is normal. Bladder function is normal. Patient not sexually active at this time. Contraception method is discussed. Postpartum depression screening: negative.    The following portions of the patient's history were reviewed and updated as appropriate: allergies, current medications, past family history, past medical history, past social history, past surgical history and problem list.    Review of Systems  Pertinent items are noted in HPI.  Patient also continues to have some pelvic floor instability    Objective   /70   Ht 167.6 cm (66\")   Wt 63 kg (139 lb)   LMP  (LMP Unknown)   Breastfeeding Yes   BMI 22.44 kg/m²    General:  alert    Breasts:  normal       Heart:  regular rate and rhytm   Abdomen: Normal findings    Vulva:  normal   Vagina: normal   Cervix:  Normal with no cervical motion tenderness   Corpus: Normal for post partum visit   Adnexa:  Non tender, non enlarged         Diagnoses and all orders for this visit:    1. History of postpartum depression (Primary)    2. S/P repeat low transverse     3. Breech presentation delivered    4. Pelvic floor instability  -     Ambulatory Referral to Physical Therapy Evaluate and treat       Normal postpartum exam. Pap smear done at today's visit.     Contraception: discussed declined any active contraception at this stage   Slow return to normal activities reviewed. Continue prenatal vitamins.   Follow up in " 6 to 8 weeks for GYN ultrasound to determine if uterus is truly heart-shaped or arcuate.  Was not seen on exam and delivery    Physical therapy for pelvic floor stability exercises has been placed      Problem List Items Addressed This Visit        Gravid and     S/P repeat low transverse     History of postpartum depression - Primary    RESOLVED: Breech presentation delivered    Overview     Floating vidya breech presentation        Other Visit Diagnoses     Pelvic floor instability        Relevant Orders    Ambulatory Referral to Physical Therapy Evaluate and treat (Completed)               EMR Dragon/ Transcription disclaimer:  Much of the encounter note is an electronic transcription/translation of spoken language to printed text. The electronic translation of spoken language may permit erroneous, or at times, nonessential words or phrases to be inadvertently transcribes; Although i have reviewed the note for such errors, some may still exist.

## 2023-02-01 ENCOUNTER — TELEPHONE (OUTPATIENT)
Dept: OBSTETRICS AND GYNECOLOGY | Age: 33
End: 2023-02-01
Payer: COMMERCIAL

## 2023-02-01 NOTE — TELEPHONE ENCOUNTER
Pt calls she delivered on 12/22/22 asking if she still needs to continue taking her iron supplement?

## 2023-03-28 ENCOUNTER — OFFICE VISIT (OUTPATIENT)
Dept: OBSTETRICS AND GYNECOLOGY | Age: 33
End: 2023-03-28
Payer: COMMERCIAL

## 2023-03-28 VITALS
SYSTOLIC BLOOD PRESSURE: 110 MMHG | DIASTOLIC BLOOD PRESSURE: 64 MMHG | HEIGHT: 66 IN | WEIGHT: 139 LBS | BODY MASS INDEX: 22.34 KG/M2

## 2023-03-28 DIAGNOSIS — O34.599: Primary | ICD-10-CM

## 2023-03-28 NOTE — PROGRESS NOTES
Subjective       History of Present Illness  Suzanne Wade is a 33 y.o. female is being seen today for evaluation of reported uterine anomaly  Chief Complaint   Patient presents with   • Gynecologic Exam     Gyn u/s: check uterus: heart shaped or arcuate   .        The following portions of the patient's history were reviewed and updated as appropriate: allergies, current medications, past family history, past medical history, past social history, past surgical history and problem list.    PAST MEDICAL HISTORY  No past medical history on file.  OB History    Para Term  AB Living   3 2 2   1 2   SAB IAB Ectopic Molar Multiple Live Births   1       0 2      # Outcome Date GA Lbr Gabe/2nd Weight Sex Delivery Anes PTL Lv   3 Term 22 39w0d  3470 g (7 lb 10.4 oz) M CS-LTranv Spinal N RAYMUNDO      Birth Comments: Panda in OR1   2 SAB     U SAB   FD   1 Term 19 39w0d  3145 g (6 lb 14.9 oz) F CS-LTranv Spinal N RAYMUNDO     Past Surgical History:   Procedure Laterality Date   •  SECTION N/A 3/22/2019    Procedure:  SECTION PRIMARY;  Surgeon: Macey Hayden MD;  Location:  [facility];  Service: Obstetrics/Gynecology   •  SECTION N/A 2022    Procedure:  SECTION REPEAT;  Surgeon: Jhon Palacios MD;  Location:  [facility];  Service: Obstetrics/Gynecology;  Laterality: N/A;   • SKIN SURGERY      birth escobar removed    • WISDOM TOOTH EXTRACTION       No family history on file.  Social History     Tobacco Use   Smoking Status Never   Smokeless Tobacco Never       Current Outpatient Medications:   •  cholecalciferol (VITAMIN D3) 25 MCG (1000 UT) tablet, Take 1 tablet by mouth Daily., Disp: , Rfl:   •  Prenatal Vit-Fe Fumarate-FA (PRENATAL 27-) 27-1 MG tablet tablet, Take 1 tablet by mouth Daily., Disp: , Rfl:   •  sertraline (ZOLOFT) 50 MG tablet, Take 1 tablet by mouth Daily., Disp: 90 tablet, Rfl: 3  Immunization History   Administered Date(s)  Administered   • FluLaval/Fluzone >6mos 10/05/2022   • Influenza, Unspecified 10/01/2021, 10/05/2022   • Tdap 12/11/2019, 10/05/2022       Review of Systems       Except as outlined in history of physical illness, patient denies any changes in her GYN, , GI systems. All other systems reviewed are negative.    Objective   Physical Exam   Alert and oriented, respirations unlabored, heart regular rate and rhythm   Pelvic see ultrasound        Assessment & Plan   Diagnoses and all orders for this visit:    1. Uterine abnormality in pregnancy, unspecified trimester (Primary)    Ultrasound did not reveal any uterine anomaly, there was a small amount of endometrial fluid inside the uterus, adnexa did not show any dominant follicles they were normal size.  Consistent with breast-feeding status.  Small amount of free fluid noted.  Myometrium was homogenous.  No fibroids no polyps seen  No evidence of arcuate or heart-shaped uterus             No orders of the defined types were placed in this encounter.          EMR Dragon/ Transcription disclaimer:  Much of the encounter note is an electronic transcription/translation of spoken language to printed text. The electronic translation of spoken language may permit erroneous, or at times, nonessential words or phrases to be inadvertently transcribes; Although i have reviewed the note for such errors, some may still exist.

## 2024-04-11 ENCOUNTER — OFFICE VISIT (OUTPATIENT)
Dept: OBSTETRICS AND GYNECOLOGY | Age: 34
End: 2024-04-11
Payer: COMMERCIAL

## 2024-04-11 VITALS
BODY MASS INDEX: 21.35 KG/M2 | WEIGHT: 136 LBS | DIASTOLIC BLOOD PRESSURE: 64 MMHG | HEIGHT: 67 IN | SYSTOLIC BLOOD PRESSURE: 102 MMHG

## 2024-04-11 DIAGNOSIS — N63.25 BREAST LUMP ON LEFT SIDE AT 3 O'CLOCK POSITION: Primary | ICD-10-CM

## 2024-04-11 PROCEDURE — 99213 OFFICE O/P EST LOW 20 MIN: CPT | Performed by: PHYSICIAN ASSISTANT

## 2024-04-11 NOTE — PROGRESS NOTES
"Subjective     Chief Complaint   Patient presents with    Breast Problem     C/o left breast lump non tender, first noticed about a week ago       Suzanne Wade is a 34 y.o.  whose LMP is Patient's last menstrual period was 2024 (exact date). presents with breast lump    Recently noted a breast lump first found it a week ago  Did note some breast tenderness a month after  That resolved  Breast fed up until her son was a year old    Does have 3 sisters and they have h/o breast cysts    MGM with h/o breast cancer-?50    No Additional Complaints Reported    The following portions of the patient's history were reviewed and updated as appropriate:no additional history reviewed, vital signs, allergies, current medications, past medical history, past social history, past surgical history, and problem list      Review of Systems   Integument/breast: positive for breast lump     Objective      /64   Ht 170.2 cm (67\")   Wt 61.7 kg (136 lb)   LMP 2024 (Exact Date)   Breastfeeding No   BMI 21.30 kg/m²     Physical Exam    General:   alert, comfortable, and no distress   Heart: Not performed today   Lungs: Not performed today.   Breast: Inspection negative, No nipple discharge or bleeding, No axillary or supraclavicular adenopathy, positive findings: nipple inversion on the right, fibrocystic changes, and 2 cm nodule located on the left 3 o'clock, mobile and non tender   Neck: Not performed today   Abdomen: Not performed today   CVA: Not performed today   Pelvis: Not performed today   Extremities: Not performed today   Neurologic: negative   Psychiatric: Normal affect, judgement, and mood       Lab Review   Labs: No data reviewed    Imaging   No data reviewed    Assessment & Plan     ASSESSMENT  1. Breast lump on left side at 3 o'clock position          PLAN  1.   Orders Placed This Encounter   Procedures    Mammo Diagnostic Bilateral With CAD    US Breast Left Limited       2. Breast " imaging ordered. Disc breast surgeon consult as well, can consider and will await breast imaging results first. Will confirm with her mom on what age her MGM was at dx of breast cancer    Follow up: 3 month(s)    GORDON Morgan  4/11/2024

## 2024-04-17 ENCOUNTER — APPOINTMENT (OUTPATIENT)
Dept: WOMENS IMAGING | Facility: HOSPITAL | Age: 34
End: 2024-04-17
Payer: COMMERCIAL

## 2024-04-17 PROCEDURE — 77066 DX MAMMO INCL CAD BI: CPT | Performed by: RADIOLOGY

## 2024-04-17 PROCEDURE — 77062 BREAST TOMOSYNTHESIS BI: CPT | Performed by: RADIOLOGY

## 2024-04-17 PROCEDURE — G0279 TOMOSYNTHESIS, MAMMO: HCPCS | Performed by: RADIOLOGY

## 2024-04-17 PROCEDURE — 76642 ULTRASOUND BREAST LIMITED: CPT | Performed by: RADIOLOGY

## 2024-05-29 ENCOUNTER — OFFICE VISIT (OUTPATIENT)
Dept: OBSTETRICS AND GYNECOLOGY | Age: 34
End: 2024-05-29
Payer: COMMERCIAL

## 2024-05-29 VITALS
DIASTOLIC BLOOD PRESSURE: 64 MMHG | SYSTOLIC BLOOD PRESSURE: 106 MMHG | BODY MASS INDEX: 21.35 KG/M2 | HEIGHT: 67 IN | WEIGHT: 136 LBS

## 2024-05-29 DIAGNOSIS — R92.1 BREAST CALCIFICATIONS ON MAMMOGRAM: ICD-10-CM

## 2024-05-29 DIAGNOSIS — Z12.4 SCREENING FOR CERVICAL CANCER: ICD-10-CM

## 2024-05-29 DIAGNOSIS — Z01.419 ENCOUNTER FOR GYNECOLOGICAL EXAMINATION: Primary | ICD-10-CM

## 2024-05-29 DIAGNOSIS — Z31.69 ENCOUNTER FOR PRECONCEPTION CONSULTATION: ICD-10-CM

## 2024-05-29 DIAGNOSIS — N94.0 MITTELSCHMERZ: ICD-10-CM

## 2024-05-29 PROBLEM — Z98.891 S/P REPEAT LOW TRANSVERSE C-SECTION: Status: RESOLVED | Noted: 2022-12-22 | Resolved: 2024-05-29

## 2024-05-29 RX ORDER — PRENATAL VIT NO.126/IRON/FOLIC 28MG-0.8MG
TABLET ORAL DAILY
COMMUNITY

## 2024-05-29 NOTE — PROGRESS NOTES
Subjective     Chief Complaint   Patient presents with    Gynecologic Exam     Annual:last pap ,Diagnostic mammo          History of Present Illness    Wellness exam  Suzanne Wade is a very pleasant  34 y.o. female who presents for annual exam.  Mammo Exam last year, showed benign calcifications bilaterally otherwise negative, Contraception nothing currently, Exercise running and resistance    Suzanne remains extremely healthy, there is an area of palpable concern in her left breast last year, imaging was negative except for benign calcifications  On today's exam she was not able to palpate any abnormality nor was I able to appreciate thing on clinical examination    Her 2 children are doing well    She does occasionally have some mittelschmerz.      Obstetric History:  OB History          3    Para   2    Term   2            AB   1    Living   2         SAB   1    IAB        Ectopic        Molar        Multiple   0    Live Births   2               Menstrual History:     Patient's last menstrual period was 2024 (exact date).       Sexual History:       No past medical history on file.  Past Surgical History:   Procedure Laterality Date     SECTION N/A 3/22/2019    Procedure:  SECTION PRIMARY;  Surgeon: Macey Hayden MD;  Location:  [facility];  Service: Obstetrics/Gynecology     SECTION N/A 2022    Procedure:  SECTION REPEAT;  Surgeon: Jhon Palacios MD;  Location:  [facility];  Service: Obstetrics/Gynecology;  Laterality: N/A;    SKIN SURGERY      birth escobar removed     WISDOM TOOTH EXTRACTION         SOCIAL Hx:      The following portions of the patient's history were reviewed and updated as appropriate: allergies, current medications, past family history, past medical history, past social history, past surgical history and problem list.    Review of Systems        Except as outlined in history of physical illness,  "patient denies any changes in her GYN, , GI systems.  All other systems reviewed were negative.         Current Outpatient Medications:     Misc Natural Products (OSTEO BI-FLEX ADV JOINT SHIELD PO), Take  by mouth., Disp: , Rfl:     prenatal vitamin (prenatal, CLASSIC, vitamin) tablet, Take  by mouth Daily., Disp: , Rfl:    Objective   Physical Exam    /64   Ht 170.2 cm (67\")   Wt 61.7 kg (136 lb)   LMP 2024 (Exact Date)   BMI 21.30 kg/m²     General: Patient is alert and oriented and appears overall healthy  Neck: Is supple without thyromegaly, no carotid bruits and no lymphadenopathy  Lungs: Clear bilaterally, no wheezing, rhonchi, or rales.  Respiratory rate is normal  Breast: Even, symmetrical, no lymphadenopathy, no retraction, no discharge, no masses or cysts  Heart: Regular rate and rhythm are appreciated, no murmurs or rubs are heard  Abdomen: Is soft, without organomegaly, bowel sounds are positive, there is no rebound or guarding and palpation does not produce any discomfort  Back: Nontender without CVA tenderness  Pelvic: External genitalia appear normal and consistent with mature female.  BUS normal                            Vagina is clean dry without discharge and appears adequately estrogenized, no lesions or masses are present                         Cervix is noninflamed without discharge or lesions.  There is no cervical motion tenderness.                Uterus is nonenlarged, without tenderness, and no masses or abnormalities are  present               Adnexa are non-enlarged, non tender               Rectal exam reveals adequate sphincter tone and no masses or lesions are appreciated on digital rectal examination.      Annual Well Woman Exam  Patient Active Problem List   Diagnosis    History of     History of postpartum depression    Breast calcifications on mammogram    [facility]                 Assessment & Plan   Diagnoses and all orders for this visit:    1. " Encounter for gynecological examination (Primary)  -     IGP, Apt HPV,rfx 16 / 18,45    2. Screening for cervical cancer    3. Breast calcifications on mammogram    4. Mittelschmerz    5. Encounter for preconception consultation    We talked about her cycles being approximately 29 days apart likely ovulating somewhere around the 15th day and the need to continue prenatal vitamins with folic acid.  If they get pregnant they are going to call and come in early.  Pelvic exam was overall very reassuring Pap smear was performed    Discussed today's findings and concerns with patient.  Continue to recommend regular exercise including cardiovascular and resistance training as well as  breast self-exam. Wellness lab, mammography, & pap smear, in accordance with age guidelines.    I have encouraged her to call for today's test results if she has not received them within 10 days.  Patient is advised to call with any change in her condition or with any other questions, otherwise return  for annual examination.

## 2024-06-01 LAB
CYTOLOGIST CVX/VAG CYTO: NORMAL
CYTOLOGY CVX/VAG DOC CYTO: NORMAL
CYTOLOGY CVX/VAG DOC THIN PREP: NORMAL
DX ICD CODE: NORMAL
HPV I/H RISK 4 DNA CVX QL PROBE+SIG AMP: NEGATIVE
Lab: NORMAL
OTHER STN SPEC: NORMAL
STAT OF ADQ CVX/VAG CYTO-IMP: NORMAL

## 2024-07-08 ENCOUNTER — TELEPHONE (OUTPATIENT)
Dept: OBSTETRICS AND GYNECOLOGY | Age: 34
End: 2024-07-08

## 2024-07-08 NOTE — TELEPHONE ENCOUNTER
Caller: Suzanne Wade    Relationship: Self    Best call back number: [phone]        Who are you requesting to speak with (clinical staff, DR. MORRIS      What was the call regarding: PATIENT ADVISED THAT SHE HAS A POSITIVE PREGNANCY TEST  LMP  5/31/24,  HUB HAD NO AVAILABILITY WITH DR MORRIS UNTIL  MIDDLE OF AUGUST. PLEASE ASSIST.

## 2024-07-23 ENCOUNTER — INITIAL PRENATAL (OUTPATIENT)
Dept: OBSTETRICS AND GYNECOLOGY | Age: 34
End: 2024-07-23
Payer: COMMERCIAL

## 2024-07-23 VITALS — BODY MASS INDEX: 21.61 KG/M2 | WEIGHT: 138 LBS | DIASTOLIC BLOOD PRESSURE: 62 MMHG | SYSTOLIC BLOOD PRESSURE: 106 MMHG

## 2024-07-23 DIAGNOSIS — Z86.59 HISTORY OF POSTPARTUM DEPRESSION: ICD-10-CM

## 2024-07-23 DIAGNOSIS — Z98.891 HISTORY OF C-SECTION: ICD-10-CM

## 2024-07-23 DIAGNOSIS — Z34.81 PRENATAL CARE, SUBSEQUENT PREGNANCY, FIRST TRIMESTER: Primary | ICD-10-CM

## 2024-07-23 DIAGNOSIS — Z87.59 HISTORY OF POSTPARTUM DEPRESSION: ICD-10-CM

## 2024-07-23 PROBLEM — N94.0 MITTELSCHMERZ: Status: RESOLVED | Noted: 2024-05-29 | Resolved: 2024-07-23

## 2024-07-23 RX ORDER — CLOTRIMAZOLE AND BETAMETHASONE DIPROPIONATE 10; .64 MG/G; MG/G
1 CREAM TOPICAL 2 TIMES DAILY
Qty: 45 G | Refills: 1 | Status: SHIPPED | OUTPATIENT
Start: 2024-07-23

## 2024-07-23 NOTE — PROGRESS NOTES
Chief Complaint   Patient presents with    Initial Prenatal Visit     NOB:lmp 24,u/s today,Discuss various supplements: Oxy Powder,Aminos,Balance powder     HPI- Pt is 34 y.o.  at 8w0d here for prenatal visit.     ROS-     - No vaginal bleeding    GI- No abdominal pain    /62   Wt 62.6 kg (138 lb)   LMP 2024   BMI 21.61 kg/m²   Exam - See flow sheet    Fetal heart rate is normal    Assessment-  Diagnoses and all orders for this visit:    Prenatal care, subsequent pregnancy, first trimester  -     Urine Culture - Urine, Urine, Clean Catch  -     POC Urinalysis Dipstick  -     OB Panel With HIV and RPR  -     Progesterone  -     HCG, B-subunit, Quantitative  -     OB Panel With HIV and RPR  -     Progesterone  -     HCG, B-subunit, Quantitative    History of     History of postpartum depression

## 2024-07-24 LAB
ABO GROUP BLD: NORMAL
BASOPHILS # BLD AUTO: 0 X10E3/UL (ref 0–0.2)
BASOPHILS NFR BLD AUTO: 0 %
BLD GP AB SCN SERPL QL: NEGATIVE
EOSINOPHIL # BLD AUTO: 0.1 X10E3/UL (ref 0–0.4)
EOSINOPHIL NFR BLD AUTO: 1 %
ERYTHROCYTE [DISTWIDTH] IN BLOOD BY AUTOMATED COUNT: 12.1 % (ref 11.7–15.4)
HBV SURFACE AG SERPL QL IA: NEGATIVE
HCG INTACT+B SERPL-ACNC: NORMAL MIU/ML
HCT VFR BLD AUTO: 36.3 % (ref 34–46.6)
HCV AB SERPL QL IA: NORMAL
HCV IGG SERPL QL IA: NON REACTIVE
HGB BLD-MCNC: 12.4 G/DL (ref 11.1–15.9)
HIV 1+2 AB+HIV1 P24 AG SERPL QL IA: NON REACTIVE
IMM GRANULOCYTES # BLD AUTO: 0 X10E3/UL (ref 0–0.1)
IMM GRANULOCYTES NFR BLD AUTO: 0 %
LYMPHOCYTES # BLD AUTO: 1.4 X10E3/UL (ref 0.7–3.1)
LYMPHOCYTES NFR BLD AUTO: 17 %
MCH RBC QN AUTO: 30.4 PG (ref 26.6–33)
MCHC RBC AUTO-ENTMCNC: 34.2 G/DL (ref 31.5–35.7)
MCV RBC AUTO: 89 FL (ref 79–97)
MONOCYTES # BLD AUTO: 0.4 X10E3/UL (ref 0.1–0.9)
MONOCYTES NFR BLD AUTO: 5 %
NEUTROPHILS # BLD AUTO: 6.2 X10E3/UL (ref 1.4–7)
NEUTROPHILS NFR BLD AUTO: 77 %
PLATELET # BLD AUTO: 246 X10E3/UL (ref 150–450)
PROGEST SERPL-MCNC: 25.4 NG/ML
RBC # BLD AUTO: 4.08 X10E6/UL (ref 3.77–5.28)
RH BLD: POSITIVE
RPR SER QL: NON REACTIVE
RUBV IGG SERPL IA-ACNC: 1.02 INDEX
WBC # BLD AUTO: 8 X10E3/UL (ref 3.4–10.8)

## 2024-07-25 LAB
BACTERIA UR CULT: NORMAL
BACTERIA UR CULT: NORMAL

## 2024-08-20 ENCOUNTER — ROUTINE PRENATAL (OUTPATIENT)
Dept: OBSTETRICS AND GYNECOLOGY | Age: 34
End: 2024-08-20
Payer: COMMERCIAL

## 2024-08-20 VITALS — WEIGHT: 140 LBS | BODY MASS INDEX: 21.93 KG/M2 | SYSTOLIC BLOOD PRESSURE: 104 MMHG | DIASTOLIC BLOOD PRESSURE: 62 MMHG

## 2024-08-20 DIAGNOSIS — Z87.59 HISTORY OF POSTPARTUM DEPRESSION: ICD-10-CM

## 2024-08-20 DIAGNOSIS — Z3A.12 12 WEEKS GESTATION OF PREGNANCY: ICD-10-CM

## 2024-08-20 DIAGNOSIS — Z86.59 HISTORY OF POSTPARTUM DEPRESSION: ICD-10-CM

## 2024-08-20 DIAGNOSIS — Z34.81 PRENATAL CARE, SUBSEQUENT PREGNANCY, FIRST TRIMESTER: Primary | ICD-10-CM

## 2024-08-20 LAB
GLUCOSE UR STRIP-MCNC: NEGATIVE MG/DL
PROT UR STRIP-MCNC: NEGATIVE MG/DL

## 2024-08-20 PROCEDURE — 0502F SUBSEQUENT PRENATAL CARE: CPT | Performed by: NURSE PRACTITIONER

## 2024-08-20 NOTE — PROGRESS NOTES
Chief Complaint   Patient presents with    Routine Prenatal Visit     12 weeks  CC:  no problems       HPI: 34 y.o.  at 12w0d     Doing well  Denies LOF, bleeding  Pt of Dr. Palacios    Vitals:    24 1141   BP: 104/62   Weight: 63.5 kg (140 lb)       ROS:  GI:  Negative  : Negative  Pulmonary: Negative     A/P  1. Intrauterine pregnancy at 12w0d   2. Pregnancy Risk:  NORMAL    Diagnoses and all orders for this visit:    1. Prenatal care, subsequent pregnancy, first trimester (Primary)    2. 12 weeks gestation of pregnancy  -     POC Urinalysis Dipstick    3. History of postpartum depression        -----------------------  PLAN:   Info given on cfDNA/carrier screening but declines today  Return in about 4 weeks (around 2024) for OB check Dr Palacios.      Susan Reyes, APRN  2024 12:00 EDT

## 2024-08-21 ENCOUNTER — CLINICAL SUPPORT (OUTPATIENT)
Dept: OBSTETRICS AND GYNECOLOGY | Age: 34
End: 2024-08-21
Payer: COMMERCIAL

## 2024-08-21 DIAGNOSIS — Z34.81 ENCOUNTER FOR SUPERVISION OF OTHER NORMAL PREGNANCY, FIRST TRIMESTER: Primary | ICD-10-CM

## 2024-09-03 ENCOUNTER — TELEPHONE (OUTPATIENT)
Dept: OBSTETRICS AND GYNECOLOGY | Age: 34
End: 2024-09-03
Payer: COMMERCIAL

## 2024-09-03 NOTE — TELEPHONE ENCOUNTER
----- Message from Jhon Link sent at 9/2/2024 11:46 AM EDT -----  Please let Suzanne know lab work shows low risk and if she wants to know gender female

## 2024-09-17 ENCOUNTER — ROUTINE PRENATAL (OUTPATIENT)
Dept: OBSTETRICS AND GYNECOLOGY | Age: 34
End: 2024-09-17
Payer: COMMERCIAL

## 2024-09-17 VITALS — BODY MASS INDEX: 22.71 KG/M2 | SYSTOLIC BLOOD PRESSURE: 106 MMHG | WEIGHT: 145 LBS | DIASTOLIC BLOOD PRESSURE: 64 MMHG

## 2024-09-17 DIAGNOSIS — Z86.59 HISTORY OF POSTPARTUM DEPRESSION: ICD-10-CM

## 2024-09-17 DIAGNOSIS — Z87.59 HISTORY OF POSTPARTUM DEPRESSION: ICD-10-CM

## 2024-09-17 DIAGNOSIS — Z34.82 PRENATAL CARE, SUBSEQUENT PREGNANCY, SECOND TRIMESTER: ICD-10-CM

## 2024-09-17 DIAGNOSIS — Z98.891 HISTORY OF C-SECTION: Primary | ICD-10-CM

## 2024-09-17 PROCEDURE — 90471 IMMUNIZATION ADMIN: CPT | Performed by: OBSTETRICS & GYNECOLOGY

## 2024-09-17 PROCEDURE — 0502F SUBSEQUENT PRENATAL CARE: CPT | Performed by: OBSTETRICS & GYNECOLOGY

## 2024-09-17 PROCEDURE — 90656 IIV3 VACC NO PRSV 0.5 ML IM: CPT | Performed by: OBSTETRICS & GYNECOLOGY

## 2024-10-16 ENCOUNTER — ROUTINE PRENATAL (OUTPATIENT)
Dept: OBSTETRICS AND GYNECOLOGY | Age: 34
End: 2024-10-16
Payer: COMMERCIAL

## 2024-10-16 VITALS — SYSTOLIC BLOOD PRESSURE: 108 MMHG | DIASTOLIC BLOOD PRESSURE: 68 MMHG | WEIGHT: 146 LBS | BODY MASS INDEX: 22.87 KG/M2

## 2024-10-16 DIAGNOSIS — Z98.891 HISTORY OF C-SECTION: ICD-10-CM

## 2024-10-16 DIAGNOSIS — Z34.82 PRENATAL CARE, SUBSEQUENT PREGNANCY, SECOND TRIMESTER: Primary | ICD-10-CM

## 2024-10-16 DIAGNOSIS — Z86.59 HISTORY OF POSTPARTUM DEPRESSION: ICD-10-CM

## 2024-10-16 DIAGNOSIS — Z87.59 HISTORY OF POSTPARTUM DEPRESSION: ICD-10-CM

## 2024-10-16 DIAGNOSIS — Z3A.20 20 WEEKS GESTATION OF PREGNANCY: ICD-10-CM

## 2024-10-16 LAB
GLUCOSE UR STRIP-MCNC: NEGATIVE MG/DL
PROT UR STRIP-MCNC: NEGATIVE MG/DL

## 2024-10-16 NOTE — PROGRESS NOTES
Chief Complaint   Patient presents with    Routine Prenatal Visit     20 weeks, Anatomy scan today  CC:  no problems         HPI: 34 y.o.  at 20w1d     Doing well  Reports FM  Denies LOF, bleeding or ctx's  Pt of Dr. Evans    Vitals:    10/16/24 1512   BP: 108/68   Weight: 66.2 kg (146 lb)       ROS:  GI:  Negative  : Negative  Pulmonary: Negative     A/P  1. Intrauterine pregnancy at 20w1d   2. Pregnancy Risk:  NORMAL    Diagnoses and all orders for this visit:    1. Prenatal care, subsequent pregnancy, second trimester (Primary)    2. 20 weeks gestation of pregnancy  -     POC Urinalysis Dipstick    3. History of     4. History of postpartum depression        -----------------------  PLAN:   Anatomy incomplete - need spine views  Normal CL  PTL warnings  Return in about 4 weeks (around 2024) for OB check and f/u anatomy US with Dr Evans.      Susan Reyes, APRN  10/16/2024 15:29 EDT

## 2024-11-13 ENCOUNTER — ROUTINE PRENATAL (OUTPATIENT)
Dept: OBSTETRICS AND GYNECOLOGY | Age: 34
End: 2024-11-13
Payer: COMMERCIAL

## 2024-11-13 VITALS — BODY MASS INDEX: 23.96 KG/M2 | SYSTOLIC BLOOD PRESSURE: 100 MMHG | WEIGHT: 153 LBS | DIASTOLIC BLOOD PRESSURE: 64 MMHG

## 2024-11-13 DIAGNOSIS — Z34.82 PRENATAL CARE, SUBSEQUENT PREGNANCY, SECOND TRIMESTER: Primary | ICD-10-CM

## 2024-11-13 PROCEDURE — 0502F SUBSEQUENT PRENATAL CARE: CPT | Performed by: OBSTETRICS & GYNECOLOGY

## 2024-11-18 ENCOUNTER — TELEPHONE (OUTPATIENT)
Dept: OBSTETRICS AND GYNECOLOGY | Age: 34
End: 2024-11-18

## 2024-11-18 NOTE — PROGRESS NOTES
Chief Complaint   Patient presents with    Pregnancy Ultrasound     HPI- Pt is 34 y.o.  at 24w6d here for prenatal visit.     OB History    Para Term  AB Living   4 2 2   1 2   SAB IAB Ectopic Molar Multiple Live Births   1       0 2      # Outcome Date GA Lbr Gabe/2nd Weight Sex Type Anes PTL Lv   4 Current            3 Term 22 39w0d  3470 g (7 lb 10.4 oz) M CS-LTranv Spinal N RAYMUNDO      Birth Comments: Panda in OR1   2 SAB     U SAB   FD   1 Term 19 39w0d  3145 g (6 lb 14.9 oz) F CS-LTranv Spinal N RAYMUNDO        Wt Readings from Last 3 Encounters:   24 69.4 kg (153 lb)   10/16/24 66.2 kg (146 lb)   24 65.8 kg (145 lb)     Temp Readings from Last 3 Encounters:   23 98.5 °F (36.9 °C) (Temporal)   22 98.2 °F (36.8 °C) (Oral)   10/31/21 98.2 °F (36.8 °C) (Temporal)     BP Readings from Last 3 Encounters:   24 100/64   10/16/24 108/68   24 106/64     Pulse Readings from Last 3 Encounters:   23 56   22 56   10/31/21 72        Last OB US Data (since 2024)         Value Time User    EFW%ILE  74%ile 10/21/2024  8:35 AM Jhon Palacios MD    AC%ILE  63%ile 10/21/2024  8:35 AM Jhon Palacios MD    Fetal Survey  Incomplete 10/21/2024  8:35 AM Jhon Palacios MD    Placenta location  posterior 10/21/2024  8:35 AM Jhon Palacios MD             ROS-     - No vaginal bleeding    GI- No abdominal pain    /64   Wt 69.4 kg (153 lb)   LMP 2024   BMI 23.96 kg/m²   Exam - See flow sheet        Assessment-  Diagnoses and all orders for this visit:    1. Prenatal care, subsequent pregnancy, second trimester (Primary)  -     POC Urinalysis Dipstick

## 2024-11-18 NOTE — TELEPHONE ENCOUNTER
Caller: Suzanne Wade    Relationship to patient: Self    Best call back number: [phone]    Patient is needing: PATIENT HAD HECTOR PANEL TESTING DONE, AT THE TIME OF THE SERVICE SHE STATES THE NURSE TOLD HER WHEN SHE GOT THE BILL IF IT SEEMED TOO HIGH TO CALL THE OFFICE. PATIENT RECEIVED A BILL FOR $645 FOR THE TESTING AND JUST WANTS TO MAKE SURE THAT IS CORRECT AS IT WAS HIGHER THAN SHE WAS EXPECTING.

## 2024-12-11 ENCOUNTER — ROUTINE PRENATAL (OUTPATIENT)
Dept: OBSTETRICS AND GYNECOLOGY | Age: 34
End: 2024-12-11
Payer: COMMERCIAL

## 2024-12-11 VITALS — DIASTOLIC BLOOD PRESSURE: 62 MMHG | BODY MASS INDEX: 24.28 KG/M2 | SYSTOLIC BLOOD PRESSURE: 108 MMHG | WEIGHT: 155 LBS

## 2024-12-11 DIAGNOSIS — Z34.83 PRENATAL CARE, SUBSEQUENT PREGNANCY, THIRD TRIMESTER: Primary | ICD-10-CM

## 2024-12-11 NOTE — PROGRESS NOTES
Chief Complaint   Patient presents with    Routine Prenatal Visit     1 hr gtt     HPI- Pt is 34 y.o.  at 28w1d here for prenatal visit.     OB History    Para Term  AB Living   4 2 2   1 2   SAB IAB Ectopic Molar Multiple Live Births   1       0 2      # Outcome Date GA Lbr Gabe/2nd Weight Sex Type Anes PTL Lv   4 Current            3 Term 22 39w0d  3470 g (7 lb 10.4 oz) M CS-LTranv Spinal N RAYMUNDO      Birth Comments: [place]anda in [place]   2 SAB     U SAB   FD   1 Term 19 39w0d  3145 g (6 lb 14.9 oz) F CS-LTranv Spinal N RAYMUNDO        Wt Readings from Last 3 Encounters:   24 70.3 kg (155 lb)   24 69.4 kg (153 lb)   10/16/24 66.2 kg (146 lb)     Temp Readings from Last 3 Encounters:   23 98.5 °F (36.9 °C) (Temporal)   22 98.2 °F (36.8 °C) (Oral)   10/31/21 98.2 °F (36.8 °C) (Temporal)     BP Readings from Last 3 Encounters:   24 108/62   24 100/64   10/16/24 108/68     Pulse Readings from Last 3 Encounters:   23 56   22 56   10/31/21 72        Last OB US Data (since 2024)         Value Time User    EFW%ILE  74%ile 10/21/2024  8:35 AM Jhon Palacios MD    AC%ILE  63%ile 10/21/2024  8:35 AM Jhon Palacios MD    Fetal Survey  Incomplete 10/21/2024  8:35 AM Jhon Palacios MD    Placenta location  posterior 10/21/2024  8:35 AM Jhon Palacios MD             ROS-     - No vaginal bleeding    GI- No abdominal pain    /62   Wt 70.3 kg (155 lb)   LMP 2024   BMI 24.28 kg/m²   Exam - See flow sheet        Assessment-  Diagnoses and all orders for this visit:    1. Prenatal care, subsequent pregnancy, third trimester (Primary)  -     POC Urinalysis Dipstick  -     Gestational Screen 1 Hr ([facility])  -     Hemoglobin  -     RPR Qualitative with Reflex to Quant       Suzanne overall seems to doing well, reassuring clinical and physical exam today  Urinalysis no evidence of infection negative nitrites.  Glucose hemoglobin RPR Tdap  today  Will start seeing patient every 2 weeks  Schedule repeat  and tubal ligation at next visit

## 2024-12-12 PROBLEM — O99.019 MATERNAL ANEMIA IN PREGNANCY, ANTEPARTUM: Status: ACTIVE | Noted: 2024-12-12

## 2024-12-12 LAB
GLUCOSE 1H P 50 G GLC PO SERPL-MCNC: 67 MG/DL (ref 70–139)
HGB BLD-MCNC: 10.8 G/DL (ref 11.1–15.9)
RPR SER QL: NON REACTIVE

## 2024-12-12 RX ORDER — FERROUS SULFATE 325(65) MG
325 TABLET ORAL
Qty: 30 TABLET | Refills: 11 | Status: SHIPPED | OUTPATIENT
Start: 2024-12-12

## 2024-12-12 NOTE — PROGRESS NOTES
Please notify no evidence of gestational diabetes iron count is just a little bit low recommend taking an additional iron pill daily and I have sent that prescription in

## 2024-12-24 ENCOUNTER — ROUTINE PRENATAL (OUTPATIENT)
Dept: OBSTETRICS AND GYNECOLOGY | Age: 34
End: 2024-12-24
Payer: COMMERCIAL

## 2024-12-24 VITALS — WEIGHT: 156 LBS | SYSTOLIC BLOOD PRESSURE: 108 MMHG | BODY MASS INDEX: 24.43 KG/M2 | DIASTOLIC BLOOD PRESSURE: 68 MMHG

## 2024-12-24 DIAGNOSIS — O99.019 MATERNAL ANEMIA IN PREGNANCY, ANTEPARTUM: ICD-10-CM

## 2024-12-24 DIAGNOSIS — Z3A.30 30 WEEKS GESTATION OF PREGNANCY: ICD-10-CM

## 2024-12-24 DIAGNOSIS — Z34.83 PRENATAL CARE, SUBSEQUENT PREGNANCY, THIRD TRIMESTER: Primary | ICD-10-CM

## 2024-12-24 DIAGNOSIS — Z86.59 HISTORY OF POSTPARTUM DEPRESSION: ICD-10-CM

## 2024-12-24 DIAGNOSIS — R30.0 BURNING WITH URINATION: ICD-10-CM

## 2024-12-24 DIAGNOSIS — Z98.891 HISTORY OF C-SECTION: ICD-10-CM

## 2024-12-24 DIAGNOSIS — Z87.59 HISTORY OF POSTPARTUM DEPRESSION: ICD-10-CM

## 2024-12-24 LAB
BILIRUB BLD-MCNC: NEGATIVE MG/DL
CLARITY, POC: CLEAR
COLOR UR: YELLOW
GLUCOSE UR STRIP-MCNC: NEGATIVE MG/DL
GLUCOSE UR STRIP-MCNC: NEGATIVE MG/DL
KETONES UR QL: NEGATIVE
LEUKOCYTE EST, POC: ABNORMAL
NITRITE UR-MCNC: NEGATIVE MG/ML
PH UR: 6.5 [PH] (ref 5–8)
PROT UR STRIP-MCNC: NEGATIVE MG/DL
PROT UR STRIP-MCNC: NEGATIVE MG/DL
RBC # UR STRIP: NEGATIVE /UL
SP GR UR: 1.02 (ref 1–1.03)
UROBILINOGEN UR QL: ABNORMAL

## 2024-12-24 NOTE — PROGRESS NOTES
Chief Complaint   Patient presents with    Routine Prenatal Visit     Ob Check - Pt is 30w0d, Pt stating she has had a few leg cramps that wake her up during sleep at night, Pt c/o upset stomach this morning but state tums helps       HPI: 34 y.o.  at 30w0d     Doing well  Reports good FM  Denies LOF, bleeding or ctx's  Having some leg cramps at night  Pt of Dr. Palacios    Vitals:    24 1003   BP: 108/68   Weight: 70.8 kg (156 lb)       ROS:  GI:  Negative  : Negative  Pulmonary: Negative     A/P  1. Intrauterine pregnancy at 30w0d   2. Pregnancy Risk:  NORMAL    Diagnoses and all orders for this visit:    1. Prenatal care, subsequent pregnancy, third trimester (Primary)    2. 30 weeks gestation of pregnancy  -     POC Urinalysis Dipstick    3. History of     4. History of postpartum depression    5. Maternal anemia in pregnancy, antepartum        -----------------------  PLAN:   PTL/FKC discussed  Leg cramps - increase hydration, stretches and add magnesium at night  Anemia - continue iron   Urine culture sent  Return for Next scheduled follow up.      Susan Reyes, EDWINA  2024 10:11 EST

## 2024-12-27 LAB
BACTERIA UR CULT: NORMAL
BACTERIA UR CULT: NORMAL

## 2025-01-08 ENCOUNTER — PREP FOR SURGERY (OUTPATIENT)
Dept: OTHER | Facility: HOSPITAL | Age: 35
End: 2025-01-08
Payer: COMMERCIAL

## 2025-01-08 ENCOUNTER — ROUTINE PRENATAL (OUTPATIENT)
Dept: OBSTETRICS AND GYNECOLOGY | Age: 35
End: 2025-01-08
Payer: COMMERCIAL

## 2025-01-08 VITALS — DIASTOLIC BLOOD PRESSURE: 60 MMHG | BODY MASS INDEX: 25.22 KG/M2 | WEIGHT: 161 LBS | SYSTOLIC BLOOD PRESSURE: 100 MMHG

## 2025-01-08 DIAGNOSIS — Z98.891 HISTORY OF C-SECTION: Primary | ICD-10-CM

## 2025-01-08 DIAGNOSIS — Z34.83 PRENATAL CARE, SUBSEQUENT PREGNANCY, THIRD TRIMESTER: Primary | ICD-10-CM

## 2025-01-08 RX ORDER — ONDANSETRON 2 MG/ML
4 INJECTION INTRAMUSCULAR; INTRAVENOUS EVERY 6 HOURS PRN
OUTPATIENT
Start: 2025-01-08

## 2025-01-08 RX ORDER — LIDOCAINE HYDROCHLORIDE 10 MG/ML
0.5 INJECTION, SOLUTION INFILTRATION; PERINEURAL ONCE AS NEEDED
OUTPATIENT
Start: 2025-01-08

## 2025-01-08 RX ORDER — SODIUM CHLORIDE 0.9 % (FLUSH) 0.9 %
10 SYRINGE (ML) INJECTION AS NEEDED
OUTPATIENT
Start: 2025-01-08

## 2025-01-08 RX ORDER — METHYLERGONOVINE MALEATE 0.2 MG/ML
200 INJECTION INTRAVENOUS AS NEEDED
OUTPATIENT
Start: 2025-01-08

## 2025-01-08 RX ORDER — KETOROLAC TROMETHAMINE 30 MG/ML
30 INJECTION, SOLUTION INTRAMUSCULAR; INTRAVENOUS ONCE
OUTPATIENT
Start: 2025-01-08 | End: 2025-01-08

## 2025-01-08 RX ORDER — SODIUM CHLORIDE 0.9 % (FLUSH) 0.9 %
10 SYRINGE (ML) INJECTION EVERY 12 HOURS SCHEDULED
OUTPATIENT
Start: 2025-01-08

## 2025-01-08 RX ORDER — SODIUM CHLORIDE 9 MG/ML
40 INJECTION, SOLUTION INTRAVENOUS AS NEEDED
OUTPATIENT
Start: 2025-01-08

## 2025-01-08 RX ORDER — OXYTOCIN/0.9 % SODIUM CHLORIDE 30/500 ML
250 PLASTIC BAG, INJECTION (ML) INTRAVENOUS CONTINUOUS
OUTPATIENT
Start: 2025-01-08 | End: 2025-01-08

## 2025-01-08 RX ORDER — ACETAMINOPHEN 500 MG
1000 TABLET ORAL ONCE
OUTPATIENT
Start: 2025-01-08 | End: 2025-01-08

## 2025-01-08 RX ORDER — ONDANSETRON 4 MG/1
4 TABLET, ORALLY DISINTEGRATING ORAL EVERY 6 HOURS PRN
OUTPATIENT
Start: 2025-01-08

## 2025-01-08 RX ORDER — CARBOPROST TROMETHAMINE 250 UG/ML
250 INJECTION, SOLUTION INTRAMUSCULAR AS NEEDED
OUTPATIENT
Start: 2025-01-08

## 2025-01-08 RX ORDER — MORPHINE SULFATE 2 MG/ML
2 INJECTION, SOLUTION INTRAMUSCULAR; INTRAVENOUS
OUTPATIENT
Start: 2025-01-08

## 2025-01-08 RX ORDER — OXYTOCIN/0.9 % SODIUM CHLORIDE 30/500 ML
999 PLASTIC BAG, INJECTION (ML) INTRAVENOUS ONCE
OUTPATIENT
Start: 2025-01-08

## 2025-01-08 NOTE — PROGRESS NOTES
Chief Complaint   Patient presents with    Routine Prenatal Visit     HPI- Pt is 34 y.o.  at 32w1d here for prenatal visit.     OB History    Para Term  AB Living   4 2 2   1 2   SAB IAB Ectopic Molar Multiple Live Births   1       0 2      # Outcome Date GA Lbr Gabe/2nd Weight Sex Type Anes PTL Lv   4 Current            3 Term 22 39w0d  3470 g (7 lb 10.4 oz) M CS-LTranv Spinal N RAYMUNDO      Birth Comments: [place] in [place]   2 SAB     U SAB   FD   1 Term 19 39w0d  3145 g (6 lb 14.9 oz) F CS-LTranv Spinal N RAYMUNDO        Wt Readings from Last 3 Encounters:   25 73 kg (161 lb)   24 70.8 kg (156 lb)   24 70.3 kg (155 lb)     Temp Readings from Last 3 Encounters:   23 98.5 °F (36.9 °C) (Temporal)   22 98.2 °F (36.8 °C) (Oral)   10/31/21 98.2 °F (36.8 °C) (Temporal)     BP Readings from Last 3 Encounters:   25 100/60   24 108/68   24 108/62     Pulse Readings from Last 3 Encounters:   23 56   22 56   10/31/21 72        Last OB US Data (since 2024)         Value Time User    EFW%ILE  74%ile 10/21/2024  8:35 AM Jhon Palacios MD    AC%ILE  63%ile 10/21/2024  8:35 AM Jhon Palacios MD    Fetal Survey  Incomplete 10/21/2024  8:35 AM Jhon Palacios MD    Placenta location  posterior 10/21/2024  8:35 AM Jhon Palacios MD             ROS-     - No vaginal bleeding    GI- No abdominal pain    /60   Wt 73 kg (161 lb)   LMP 2024   BMI 25.22 kg/m²   Exam - See flow sheet        Assessment-  Diagnoses and all orders for this visit:    1. Prenatal care, subsequent pregnancy, third trimester (Primary)  -     POC Urinalysis Dipstick    Doing well abdominal discomfort resolved  Had another recurrent episode of vertigo which she has had in the past complains occasionally of some ear congestion, she will either call her ENT or have a trial of Zyrtec.  Growth ultrasound at next visit  Will tentatively schedule a repeat   possible PPS, orders placed  Previous urine culture was negative

## 2025-01-22 ENCOUNTER — ROUTINE PRENATAL (OUTPATIENT)
Dept: OBSTETRICS AND GYNECOLOGY | Age: 35
End: 2025-01-22
Payer: COMMERCIAL

## 2025-01-22 VITALS — WEIGHT: 161 LBS | BODY MASS INDEX: 25.22 KG/M2 | SYSTOLIC BLOOD PRESSURE: 114 MMHG | DIASTOLIC BLOOD PRESSURE: 68 MMHG

## 2025-01-22 DIAGNOSIS — Z34.83 PRENATAL CARE, SUBSEQUENT PREGNANCY, THIRD TRIMESTER: Primary | ICD-10-CM

## 2025-01-22 DIAGNOSIS — Z87.59 HISTORY OF POSTPARTUM DEPRESSION: ICD-10-CM

## 2025-01-22 DIAGNOSIS — Z86.59 HISTORY OF POSTPARTUM DEPRESSION: ICD-10-CM

## 2025-01-22 DIAGNOSIS — Z98.891 HISTORY OF C-SECTION: ICD-10-CM

## 2025-01-22 NOTE — PROGRESS NOTES
Chief Complaint   Patient presents with    Pregnancy Ultrasound     HPI- Pt is 34 y.o.  at 34w1d here for prenatal visit.     OB History    Para Term  AB Living   4 2 2   1 2   SAB IAB Ectopic Molar Multiple Live Births   1       0 2      # Outcome Date GA Lbr Gabe/2nd Weight Sex Type Anes PTL Lv   4 Current            3 Term 22 39w0d  3470 g (7 lb 10.4 oz) M CS-LTranv Spinal N RAYMUNDO      Birth Comments: Panda in [place]   2 SAB     U SAB   FD   1 Term 19 39w0d  3145 g (6 lb 14.9 oz) F CS-LTranv Spinal N RAYMUNDO        Wt Readings from Last 3 Encounters:   25 73 kg (161 lb)   25 73 kg (161 lb)   24 70.8 kg (156 lb)     Temp Readings from Last 3 Encounters:   23 98.5 °F (36.9 °C) (Temporal)   22 98.2 °F (36.8 °C) (Oral)   10/31/21 98.2 °F (36.8 °C) (Temporal)     BP Readings from Last 3 Encounters:   25 114/68   25 100/60   24 108/68     Pulse Readings from Last 3 Encounters:   23 56   22 56   10/31/21 72        Last OB US Data (since 2024)         Value Time User    EFW%ILE  74%ile 10/21/2024  8:35 AM Jhon Palacios MD    AC%ILE  63%ile 10/21/2024  8:35 AM Jhon Palacios MD    Fetal Survey  Incomplete 10/21/2024  8:35 AM Jhon Palacios MD    Placenta location  posterior 10/21/2024  8:35 AM Jhon Palacios MD             ROS-     - No vaginal bleeding    GI- No abdominal pain    /68   Wt 73 kg (161 lb)   LMP 2024   BMI 25.22 kg/m²   Exam - See flow sheet        Assessment-  Diagnoses and all orders for this visit:    1. Prenatal care, subsequent pregnancy, third trimester (Primary)  -     POC Urinalysis Dipstick    2. History of postpartum depression    3. History of   Overview:  Secondary to breech         Reviewed today's ultrasound, good growth 5 pounds 2 ounces 41st percentile, vertex, normal fluid volume, normal cervix,  Leg cramps have improved with using over-the-counter medicines we discussed and  increasing hydration  We reviewed patient's history of emotional lability, postpartum blues and I have encouraged her to closely monitor how she is feeling during the last 4 to 6 weeks of her pregnancy.  If anything changes or she feels like she needs medications I have asked that she call  Repeat  with PPS scheduled  We will check cervix collect GBS next visit

## 2025-02-04 ENCOUNTER — ROUTINE PRENATAL (OUTPATIENT)
Dept: OBSTETRICS AND GYNECOLOGY | Age: 35
End: 2025-02-04
Payer: COMMERCIAL

## 2025-02-04 VITALS — WEIGHT: 159 LBS | BODY MASS INDEX: 24.9 KG/M2 | DIASTOLIC BLOOD PRESSURE: 74 MMHG | SYSTOLIC BLOOD PRESSURE: 110 MMHG

## 2025-02-04 DIAGNOSIS — Z3A.36 36 WEEKS GESTATION OF PREGNANCY: Primary | ICD-10-CM

## 2025-02-04 DIAGNOSIS — Z98.891 HISTORY OF C-SECTION: ICD-10-CM

## 2025-02-04 DIAGNOSIS — Z87.59 HISTORY OF POSTPARTUM DEPRESSION: ICD-10-CM

## 2025-02-04 DIAGNOSIS — O99.019 MATERNAL ANEMIA IN PREGNANCY, ANTEPARTUM: ICD-10-CM

## 2025-02-04 DIAGNOSIS — Z86.59 HISTORY OF POSTPARTUM DEPRESSION: ICD-10-CM

## 2025-02-04 DIAGNOSIS — Z36.85 ENCOUNTER FOR ANTENATAL SCREENING FOR STREPTOCOCCUS B: ICD-10-CM

## 2025-02-04 LAB
GLUCOSE UR STRIP-MCNC: NEGATIVE MG/DL
PROT UR STRIP-MCNC: NEGATIVE MG/DL

## 2025-02-04 NOTE — PROGRESS NOTES
Chief Complaint   Patient presents with    Routine Prenatal Visit     36 weeks  CC:  no problems       HPI: 34 y.o.  at 36w0d     Doing well  Reports good FM  Denies LOF, bleeding or ctx's  Pt of Dr. Palacios     Vitals:    25 1147   BP: 110/74   Weight: 72.1 kg (159 lb)       ROS:  GI:  Negative  : Negative  Pulmonary: Negative     A/P  1. Intrauterine pregnancy at 36w0d   2. Pregnancy Risk:  NORMAL    Diagnoses and all orders for this visit:    1. 36 weeks gestation of pregnancy (Primary)  -     POC Urinalysis Dipstick    2. History of     3. History of postpartum depression    4. Maternal anemia in pregnancy, antepartum    5. Encounter for  screening for Streptococcus B  -     Group B Streptococcus Culture - Swab, Vaginal/Rectum        -----------------------  PLAN:   GBS done  Info given on RSV vaccine but declines  PTL/FKC discussed  Return for Next scheduled follow up.      Susan Reyes, APRN  2025 13:47 EST

## 2025-02-08 LAB — B-HEM STREP SPEC QL CULT: NEGATIVE

## 2025-02-11 ENCOUNTER — ROUTINE PRENATAL (OUTPATIENT)
Dept: OBSTETRICS AND GYNECOLOGY | Age: 35
End: 2025-02-11
Payer: COMMERCIAL

## 2025-02-11 VITALS — DIASTOLIC BLOOD PRESSURE: 68 MMHG | SYSTOLIC BLOOD PRESSURE: 108 MMHG | WEIGHT: 162 LBS | BODY MASS INDEX: 25.37 KG/M2

## 2025-02-11 DIAGNOSIS — Z87.59 HISTORY OF POSTPARTUM DEPRESSION: ICD-10-CM

## 2025-02-11 DIAGNOSIS — Z86.59 HISTORY OF POSTPARTUM DEPRESSION: ICD-10-CM

## 2025-02-11 DIAGNOSIS — Z3A.37 37 WEEKS GESTATION OF PREGNANCY: Primary | ICD-10-CM

## 2025-02-11 DIAGNOSIS — Z98.891 HISTORY OF C-SECTION: ICD-10-CM

## 2025-02-11 LAB
GLUCOSE UR STRIP-MCNC: NEGATIVE MG/DL
PROT UR STRIP-MCNC: ABNORMAL MG/DL

## 2025-02-11 NOTE — PROGRESS NOTES
Suzanne Wade, a 34 y.o.  at 37w0d, presents for OB follow-up.  She is doing well today.  Denies loss of fluid, vaginal bleeding, and contractions.  Endorses fetal movements.  Planned repeat csection.  She is having some pelvic pressure, no contractions.      Objective  BP Readings from Last 3 Encounters:   25 108/68   25 110/74   25 114/68      Wt Readings from Last 3 Encounters:   25 73.5 kg (162 lb)   25 72.1 kg (159 lb)   25 73 kg (161 lb)      Total weight gain this pregnancy: Not found.    Review of systems:   Gen: negative  CV:     negative  GI: negative  :   good fetal movement noted  and pelvic pressure  MS:    negative  Neuro: negative and denies headaches and visual changes   Pul: negative    A/P  Diagnoses and all orders for this visit:    1. 37 weeks gestation of pregnancy (Primary)  -     POC Urinalysis Dipstick    2. History of   Overview:  Secondary to breech      3. History of postpartum depression      Patient declined RSV vaccine   Routine labor warnings were discussed and indications for L & D f/u including bleeding, regular contractions, decreased fetal movement or/and rupture of membranes.     1 week with EDWINA Freeman

## 2025-02-18 ENCOUNTER — ROUTINE PRENATAL (OUTPATIENT)
Dept: OBSTETRICS AND GYNECOLOGY | Age: 35
End: 2025-02-18
Payer: COMMERCIAL

## 2025-02-18 VITALS — WEIGHT: 163 LBS | SYSTOLIC BLOOD PRESSURE: 118 MMHG | BODY MASS INDEX: 25.53 KG/M2 | DIASTOLIC BLOOD PRESSURE: 74 MMHG

## 2025-02-18 DIAGNOSIS — Z13.89 SCREENING FOR BLOOD OR PROTEIN IN URINE: Primary | ICD-10-CM

## 2025-02-18 LAB
GLUCOSE UR STRIP-MCNC: NEGATIVE MG/DL
PROT UR STRIP-MCNC: NEGATIVE MG/DL

## 2025-02-18 NOTE — PROGRESS NOTES
Chief Complaint   Patient presents with    Routine Prenatal Visit     Ob check- 38w0d reports sharp pains that come & go     HPI- Pt is 34 y.o.  at 38w0d here for prenatal visit.     OB History    Para Term  AB Living   4 2 2   1 2   SAB IAB Ectopic Molar Multiple Live Births   1       0 2      # Outcome Date GA Lbr Gabe/2nd Weight Sex Type Anes PTL Lv   4 Current            3 Term 22 39w0d  3470 g (7 lb 10.4 oz) M CS-LTranv Spinal N RAYMUNDO      Birth Comments: [place]anda in [place]   2 SAB     U SAB   FD   1 Term 19 39w0d  3145 g (6 lb 14.9 oz) F CS-LTranv Spinal N RAYMUNDO        Wt Readings from Last 3 Encounters:   25 73.9 kg (163 lb)   25 73.5 kg (162 lb)   25 72.1 kg (159 lb)     Temp Readings from Last 3 Encounters:   23 98.5 °F (36.9 °C) (Temporal)   22 98.2 °F (36.8 °C) (Oral)   10/31/21 98.2 °F (36.8 °C) (Temporal)     BP Readings from Last 3 Encounters:   25 118/74   25 108/68   25 110/74     Pulse Readings from Last 3 Encounters:   23 56   22 56   10/31/21 72        Last OB US Data (since 2024)         Value Time User    EFW%ILE  74%ile 10/21/2024  8:35 AM Jhon Palacios MD    AC%ILE  63%ile 10/21/2024  8:35 AM Jhon Palacios MD    Fetal Survey  Incomplete 10/21/2024  8:35 AM Jhon Palacios MD    Placenta location  posterior 10/21/2024  8:35 AM Jhon Palacios MD             ROS-     - No vaginal bleeding    GI- No abdominal pain    /74   Wt 73.9 kg (163 lb)   LMP 2024   BMI 25.53 kg/m²   Exam - See flow sheet        Assessment-  Diagnoses and all orders for this visit:    1. Screening for blood or protein in urine (Primary)  -     POC Urinalysis Dipstick    Cervix is closed and thick vertex presentation  Patient does want to have a PPS, however she had told me she had already signed the papers but I do not see them in the media tab we will have her recent on those  We will restart Zoloft 50 mg on postop  day and 1

## 2025-02-24 ENCOUNTER — ROUTINE PRENATAL (OUTPATIENT)
Dept: OBSTETRICS AND GYNECOLOGY | Age: 35
End: 2025-02-24
Payer: COMMERCIAL

## 2025-02-24 VITALS — SYSTOLIC BLOOD PRESSURE: 114 MMHG | WEIGHT: 160 LBS | BODY MASS INDEX: 25.06 KG/M2 | DIASTOLIC BLOOD PRESSURE: 70 MMHG

## 2025-02-24 DIAGNOSIS — Z13.89 SCREENING FOR BLOOD OR PROTEIN IN URINE: Primary | ICD-10-CM

## 2025-02-24 LAB
GLUCOSE UR STRIP-MCNC: NEGATIVE MG/DL
PROT UR STRIP-MCNC: NEGATIVE MG/DL

## 2025-02-24 NOTE — PROGRESS NOTES
Chief Complaint   Patient presents with    Routine Prenatal Visit     Ob check- 38w6d reports good fm, no ob complaints     HPI- Pt is 34 y.o.  at 38w6d here for prenatal visit.     OB History    Para Term  AB Living   4 2 2   1 2   SAB IAB Ectopic Molar Multiple Live Births   1       0 2      # Outcome Date GA Lbr Gabe/2nd Weight Sex Type Anes PTL Lv   4 Current            3 Term 22 39w0d  3470 g (7 lb 10.4 oz) M CS-LTranv Spinal N RAYMUNDO      Birth Comments: Panda in [place]   2 SAB     U SAB   FD   1 Term 19 39w0d  3145 g (6 lb 14.9 oz) F CS-LTranv Spinal N RAYMUNDO        Wt Readings from Last 3 Encounters:   25 72.6 kg (160 lb)   25 73.9 kg (163 lb)   25 73.5 kg (162 lb)     Temp Readings from Last 3 Encounters:   23 98.5 °F (36.9 °C) (Temporal)   22 98.2 °F (36.8 °C) (Oral)   10/31/21 98.2 °F (36.8 °C) (Temporal)     BP Readings from Last 3 Encounters:   25 114/70   25 118/74   25 108/68     Pulse Readings from Last 3 Encounters:   23 56   22 56   10/31/21 72        Last OB US Data (since 2024)         Value Time User    EFW%ILE  74%ile 10/21/2024  8:35 AM Jhon Palacios MD    AC%ILE  63%ile 10/21/2024  8:35 AM Jhon Palacios MD    Fetal Survey  Incomplete 10/21/2024  8:35 AM Jhon Palacios MD    Placenta location  posterior 10/21/2024  8:35 AM Jhon Palacios MD             ROS-     - No vaginal bleeding    GI- No abdominal pain    /70   Wt 72.6 kg (160 lb)   LMP 2024   BMI 25.06 kg/m²   Exam - See flow sheet        Assessment-  Diagnoses and all orders for this visit:    1. Screening for blood or protein in urine (Primary)  -     POC Urinalysis Dipstick     2-38 weeks 6-day intrauterine pregnancy    Cervix unchanged  Reviewed staying n.p.o. except for water after midnight  Has signed PPS papers would like to keep that confidential  Reports good fetal movement

## 2025-02-26 ENCOUNTER — ANESTHESIA (OUTPATIENT)
Dept: LABOR AND DELIVERY | Facility: HOSPITAL | Age: 35
End: 2025-02-26
Payer: COMMERCIAL

## 2025-02-26 ENCOUNTER — HOSPITAL ENCOUNTER (INPATIENT)
Facility: HOSPITAL | Age: 35
LOS: 3 days | Discharge: HOME | End: 2025-03-01
Attending: OBSTETRICS & GYNECOLOGY | Admitting: OBSTETRICS & GYNECOLOGY
Payer: COMMERCIAL

## 2025-02-26 ENCOUNTER — ANESTHESIA EVENT (OUTPATIENT)
Dept: LABOR AND DELIVERY | Facility: HOSPITAL | Age: 35
End: 2025-02-26
Payer: COMMERCIAL

## 2025-02-26 DIAGNOSIS — Z98.891 HISTORY OF C-SECTION: ICD-10-CM

## 2025-02-26 LAB
ABO GROUP BLD: NORMAL
BASOPHILS # BLD AUTO: 0.04 10*3/MM3 (ref 0–0.2)
BASOPHILS NFR BLD AUTO: 0.5 % (ref 0–1.5)
BLD GP AB SCN SERPL QL: NEGATIVE
DEPRECATED RDW RBC AUTO: 41.8 FL (ref 37–54)
EOSINOPHIL # BLD AUTO: 0.07 10*3/MM3 (ref 0–0.4)
EOSINOPHIL NFR BLD AUTO: 0.9 % (ref 0.3–6.2)
ERYTHROCYTE [DISTWIDTH] IN BLOOD BY AUTOMATED COUNT: 12.5 % (ref 12.3–15.4)
HCT VFR BLD AUTO: 37.9 % (ref 34–46.6)
HGB BLD-MCNC: 12.6 G/DL (ref 12–15.9)
IMM GRANULOCYTES # BLD AUTO: 0.02 10*3/MM3 (ref 0–0.05)
IMM GRANULOCYTES NFR BLD AUTO: 0.3 % (ref 0–0.5)
LYMPHOCYTES # BLD AUTO: 1.35 10*3/MM3 (ref 0.7–3.1)
LYMPHOCYTES NFR BLD AUTO: 17.2 % (ref 19.6–45.3)
MCH RBC QN AUTO: 30.6 PG (ref 26.6–33)
MCHC RBC AUTO-ENTMCNC: 33.2 G/DL (ref 31.5–35.7)
MCV RBC AUTO: 92 FL (ref 79–97)
MONOCYTES # BLD AUTO: 0.62 10*3/MM3 (ref 0.1–0.9)
MONOCYTES NFR BLD AUTO: 7.9 % (ref 5–12)
NEUTROPHILS NFR BLD AUTO: 5.73 10*3/MM3 (ref 1.7–7)
NEUTROPHILS NFR BLD AUTO: 73.2 % (ref 42.7–76)
NRBC BLD AUTO-RTO: 0 /100 WBC (ref 0–0.2)
PLATELET # BLD AUTO: 198 10*3/MM3 (ref 140–450)
PMV BLD AUTO: 10.5 FL (ref 6–12)
RBC # BLD AUTO: 4.12 10*6/MM3 (ref 3.77–5.28)
RH BLD: POSITIVE
T&S EXPIRATION DATE: NORMAL
TREPONEMA PALLIDUM IGG+IGM AB [PRESENCE] IN SERUM OR PLASMA BY IMMUNOASSAY: NORMAL
WBC NRBC COR # BLD AUTO: 7.83 10*3/MM3 (ref 3.4–10.8)

## 2025-02-26 PROCEDURE — 25010000002 ONDANSETRON PER 1 MG: Performed by: NURSE ANESTHETIST, CERTIFIED REGISTERED

## 2025-02-26 PROCEDURE — 86780 TREPONEMA PALLIDUM: CPT | Performed by: OBSTETRICS & GYNECOLOGY

## 2025-02-26 PROCEDURE — 25810000003 LACTATED RINGERS PER 1000 ML: Performed by: OBSTETRICS & GYNECOLOGY

## 2025-02-26 PROCEDURE — 25010000002 CEFAZOLIN PER 500 MG: Performed by: OBSTETRICS & GYNECOLOGY

## 2025-02-26 PROCEDURE — 25010000002 BUPIVACAINE PF 0.75 % SOLUTION: Performed by: ANESTHESIOLOGY

## 2025-02-26 PROCEDURE — 86901 BLOOD TYPING SEROLOGIC RH(D): CPT | Performed by: OBSTETRICS & GYNECOLOGY

## 2025-02-26 PROCEDURE — 25010000002 HYDROMORPHONE PER 4 MG: Performed by: NURSE ANESTHETIST, CERTIFIED REGISTERED

## 2025-02-26 PROCEDURE — 0UB70ZZ EXCISION OF BILATERAL FALLOPIAN TUBES, OPEN APPROACH: ICD-10-PCS | Performed by: OBSTETRICS & GYNECOLOGY

## 2025-02-26 PROCEDURE — 86900 BLOOD TYPING SEROLOGIC ABO: CPT | Performed by: OBSTETRICS & GYNECOLOGY

## 2025-02-26 PROCEDURE — 63710000001 ONDANSETRON ODT 4 MG TABLET DISPERSIBLE: Performed by: OBSTETRICS & GYNECOLOGY

## 2025-02-26 PROCEDURE — 25810000003 LACTATED RINGERS SOLUTION: Performed by: OBSTETRICS & GYNECOLOGY

## 2025-02-26 PROCEDURE — S0260 H&P FOR SURGERY: HCPCS | Performed by: OBSTETRICS & GYNECOLOGY

## 2025-02-26 PROCEDURE — 86850 RBC ANTIBODY SCREEN: CPT | Performed by: OBSTETRICS & GYNECOLOGY

## 2025-02-26 PROCEDURE — 25010000002 PHENYLEPHRINE 10 MG/ML SOLUTION: Performed by: NURSE ANESTHETIST, CERTIFIED REGISTERED

## 2025-02-26 PROCEDURE — 59515 CESAREAN DELIVERY: CPT | Performed by: OBSTETRICS & GYNECOLOGY

## 2025-02-26 PROCEDURE — 88302 TISSUE EXAM BY PATHOLOGIST: CPT | Performed by: OBSTETRICS & GYNECOLOGY

## 2025-02-26 PROCEDURE — 85025 COMPLETE CBC W/AUTO DIFF WBC: CPT | Performed by: OBSTETRICS & GYNECOLOGY

## 2025-02-26 PROCEDURE — 25010000002 ONDANSETRON PER 1 MG: Performed by: ANESTHESIOLOGY

## 2025-02-26 PROCEDURE — 25010000002 MORPHINE PER 10 MG: Performed by: ANESTHESIOLOGY

## 2025-02-26 PROCEDURE — 25010000002 KETOROLAC TROMETHAMINE PER 15 MG: Performed by: OBSTETRICS & GYNECOLOGY

## 2025-02-26 RX ORDER — SODIUM CHLORIDE 9 MG/ML
40 INJECTION, SOLUTION INTRAVENOUS AS NEEDED
Status: DISCONTINUED | OUTPATIENT
Start: 2025-02-26 | End: 2025-02-26 | Stop reason: HOSPADM

## 2025-02-26 RX ORDER — HYDROMORPHONE HYDROCHLORIDE 1 MG/ML
0.5 INJECTION, SOLUTION INTRAMUSCULAR; INTRAVENOUS; SUBCUTANEOUS
Status: DISCONTINUED | OUTPATIENT
Start: 2025-02-26 | End: 2025-03-01 | Stop reason: HOSPADM

## 2025-02-26 RX ORDER — MORPHINE SULFATE 4 MG/ML
INJECTION, SOLUTION INTRAMUSCULAR; INTRAVENOUS
Status: COMPLETED | OUTPATIENT
Start: 2025-02-26 | End: 2025-02-26

## 2025-02-26 RX ORDER — DIPHENHYDRAMINE HCL 25 MG
25 CAPSULE ORAL NIGHTLY PRN
Status: DISCONTINUED | OUTPATIENT
Start: 2025-02-26 | End: 2025-03-01 | Stop reason: HOSPADM

## 2025-02-26 RX ORDER — BISACODYL 10 MG
10 SUPPOSITORY, RECTAL RECTAL DAILY PRN
Status: DISCONTINUED | OUTPATIENT
Start: 2025-02-26 | End: 2025-03-01 | Stop reason: HOSPADM

## 2025-02-26 RX ORDER — FAMOTIDINE 10 MG/ML
20 INJECTION, SOLUTION INTRAVENOUS ONCE AS NEEDED
Status: COMPLETED | OUTPATIENT
Start: 2025-02-26 | End: 2025-02-26

## 2025-02-26 RX ORDER — NALOXONE HCL 0.4 MG/ML
0.2 VIAL (ML) INJECTION
Status: DISCONTINUED | OUTPATIENT
Start: 2025-02-26 | End: 2025-03-01 | Stop reason: HOSPADM

## 2025-02-26 RX ORDER — OXYTOCIN/0.9 % SODIUM CHLORIDE 30/500 ML
999 PLASTIC BAG, INJECTION (ML) INTRAVENOUS ONCE
Status: COMPLETED | OUTPATIENT
Start: 2025-02-26 | End: 2025-02-26

## 2025-02-26 RX ORDER — BUPIVACAINE HYDROCHLORIDE 7.5 MG/ML
INJECTION, SOLUTION EPIDURAL; RETROBULBAR
Status: COMPLETED | OUTPATIENT
Start: 2025-02-26 | End: 2025-02-26

## 2025-02-26 RX ORDER — PRENATAL VIT/IRON FUM/FOLIC AC 27MG-0.8MG
1 TABLET ORAL DAILY
Status: DISCONTINUED | OUTPATIENT
Start: 2025-02-26 | End: 2025-03-01 | Stop reason: HOSPADM

## 2025-02-26 RX ORDER — ACETAMINOPHEN 500 MG
1000 TABLET ORAL EVERY 6 HOURS PRN
COMMUNITY

## 2025-02-26 RX ORDER — HYDROMORPHONE HYDROCHLORIDE 1 MG/ML
0.5 INJECTION, SOLUTION INTRAMUSCULAR; INTRAVENOUS; SUBCUTANEOUS
Status: DISCONTINUED | OUTPATIENT
Start: 2025-02-26 | End: 2025-02-26 | Stop reason: HOSPADM

## 2025-02-26 RX ORDER — MORPHINE SULFATE 2 MG/ML
2 INJECTION, SOLUTION INTRAMUSCULAR; INTRAVENOUS
Status: ACTIVE | OUTPATIENT
Start: 2025-02-26 | End: 2025-02-26

## 2025-02-26 RX ORDER — ONDANSETRON 4 MG/1
4 TABLET, ORALLY DISINTEGRATING ORAL EVERY 6 HOURS PRN
Status: DISCONTINUED | OUTPATIENT
Start: 2025-02-26 | End: 2025-03-01 | Stop reason: HOSPADM

## 2025-02-26 RX ORDER — ONDANSETRON 2 MG/ML
INJECTION INTRAMUSCULAR; INTRAVENOUS AS NEEDED
Status: DISCONTINUED | OUTPATIENT
Start: 2025-02-26 | End: 2025-02-26 | Stop reason: SURG

## 2025-02-26 RX ORDER — SODIUM CHLORIDE, SODIUM LACTATE, POTASSIUM CHLORIDE, CALCIUM CHLORIDE 600; 310; 30; 20 MG/100ML; MG/100ML; MG/100ML; MG/100ML
125 INJECTION, SOLUTION INTRAVENOUS CONTINUOUS
Status: DISCONTINUED | OUTPATIENT
Start: 2025-02-26 | End: 2025-02-26

## 2025-02-26 RX ORDER — ONDANSETRON 2 MG/ML
4 INJECTION INTRAMUSCULAR; INTRAVENOUS EVERY 6 HOURS PRN
Status: DISCONTINUED | OUTPATIENT
Start: 2025-02-26 | End: 2025-03-01 | Stop reason: HOSPADM

## 2025-02-26 RX ORDER — ACETAMINOPHEN 500 MG
1000 TABLET ORAL ONCE
Status: COMPLETED | OUTPATIENT
Start: 2025-02-26 | End: 2025-02-26

## 2025-02-26 RX ORDER — OXYTOCIN/0.9 % SODIUM CHLORIDE 30/500 ML
250 PLASTIC BAG, INJECTION (ML) INTRAVENOUS CONTINUOUS
Status: DISPENSED | OUTPATIENT
Start: 2025-02-26 | End: 2025-02-26

## 2025-02-26 RX ORDER — ACETAMINOPHEN 325 MG/1
650 TABLET ORAL EVERY 6 HOURS PRN
Status: DISCONTINUED | OUTPATIENT
Start: 2025-02-26 | End: 2025-02-26

## 2025-02-26 RX ORDER — PHENYLEPHRINE HYDROCHLORIDE 10 MG/ML
INJECTION INTRAVENOUS AS NEEDED
Status: DISCONTINUED | OUTPATIENT
Start: 2025-02-26 | End: 2025-02-26 | Stop reason: SURG

## 2025-02-26 RX ORDER — METHYLERGONOVINE MALEATE 0.2 MG/ML
200 INJECTION INTRAVENOUS AS NEEDED
Status: DISCONTINUED | OUTPATIENT
Start: 2025-02-26 | End: 2025-02-26 | Stop reason: HOSPADM

## 2025-02-26 RX ORDER — ONDANSETRON 4 MG/1
4 TABLET, ORALLY DISINTEGRATING ORAL EVERY 6 HOURS PRN
Status: DISCONTINUED | OUTPATIENT
Start: 2025-02-26 | End: 2025-02-26 | Stop reason: HOSPADM

## 2025-02-26 RX ORDER — ACETAMINOPHEN 325 MG/1
650 TABLET ORAL EVERY 6 HOURS SCHEDULED
Status: DISCONTINUED | OUTPATIENT
Start: 2025-02-27 | End: 2025-03-01 | Stop reason: HOSPADM

## 2025-02-26 RX ORDER — OXYCODONE AND ACETAMINOPHEN 5; 325 MG/1; MG/1
1 TABLET ORAL EVERY 4 HOURS PRN
Status: DISCONTINUED | OUTPATIENT
Start: 2025-02-26 | End: 2025-03-01 | Stop reason: HOSPADM

## 2025-02-26 RX ORDER — NALOXONE HCL 0.4 MG/ML
0.1 VIAL (ML) INJECTION
Status: DISCONTINUED | OUTPATIENT
Start: 2025-02-26 | End: 2025-03-01 | Stop reason: HOSPADM

## 2025-02-26 RX ORDER — SIMETHICONE 80 MG
80 TABLET,CHEWABLE ORAL 4 TIMES DAILY PRN
Status: DISCONTINUED | OUTPATIENT
Start: 2025-02-26 | End: 2025-03-01 | Stop reason: HOSPADM

## 2025-02-26 RX ORDER — ONDANSETRON 2 MG/ML
4 INJECTION INTRAMUSCULAR; INTRAVENOUS ONCE AS NEEDED
Status: DISCONTINUED | OUTPATIENT
Start: 2025-02-26 | End: 2025-03-01 | Stop reason: HOSPADM

## 2025-02-26 RX ORDER — LIDOCAINE HYDROCHLORIDE 10 MG/ML
0.5 INJECTION, SOLUTION INFILTRATION; PERINEURAL ONCE AS NEEDED
Status: DISCONTINUED | OUTPATIENT
Start: 2025-02-26 | End: 2025-02-26 | Stop reason: HOSPADM

## 2025-02-26 RX ORDER — DIPHENHYDRAMINE HYDROCHLORIDE 50 MG/ML
25 INJECTION INTRAMUSCULAR; INTRAVENOUS EVERY 4 HOURS PRN
Status: DISCONTINUED | OUTPATIENT
Start: 2025-02-26 | End: 2025-03-01 | Stop reason: HOSPADM

## 2025-02-26 RX ORDER — DOCUSATE SODIUM 100 MG/1
100 CAPSULE, LIQUID FILLED ORAL 2 TIMES DAILY PRN
Status: DISCONTINUED | OUTPATIENT
Start: 2025-02-26 | End: 2025-03-01 | Stop reason: HOSPADM

## 2025-02-26 RX ORDER — ONDANSETRON 2 MG/ML
4 INJECTION INTRAMUSCULAR; INTRAVENOUS EVERY 6 HOURS PRN
Status: DISCONTINUED | OUTPATIENT
Start: 2025-02-26 | End: 2025-02-26 | Stop reason: HOSPADM

## 2025-02-26 RX ORDER — OXYTOCIN/0.9 % SODIUM CHLORIDE 30/500 ML
125 PLASTIC BAG, INJECTION (ML) INTRAVENOUS ONCE AS NEEDED
Status: COMPLETED | OUTPATIENT
Start: 2025-02-26 | End: 2025-02-26

## 2025-02-26 RX ORDER — KETOROLAC TROMETHAMINE 30 MG/ML
30 INJECTION, SOLUTION INTRAMUSCULAR; INTRAVENOUS EVERY 6 HOURS PRN
Status: DISCONTINUED | OUTPATIENT
Start: 2025-02-26 | End: 2025-02-26

## 2025-02-26 RX ORDER — IBUPROFEN 600 MG/1
600 TABLET, FILM COATED ORAL EVERY 6 HOURS SCHEDULED
Status: DISCONTINUED | OUTPATIENT
Start: 2025-02-27 | End: 2025-03-01 | Stop reason: HOSPADM

## 2025-02-26 RX ORDER — MORPHINE SULFATE 2 MG/ML
1 INJECTION, SOLUTION INTRAMUSCULAR; INTRAVENOUS EVERY 4 HOURS PRN
Status: DISCONTINUED | OUTPATIENT
Start: 2025-02-26 | End: 2025-03-01 | Stop reason: HOSPADM

## 2025-02-26 RX ORDER — KETOROLAC TROMETHAMINE 15 MG/ML
15 INJECTION, SOLUTION INTRAMUSCULAR; INTRAVENOUS EVERY 6 HOURS SCHEDULED
Status: DISPENSED | OUTPATIENT
Start: 2025-02-26 | End: 2025-02-27

## 2025-02-26 RX ORDER — DIPHENHYDRAMINE HYDROCHLORIDE 50 MG/ML
25 INJECTION INTRAMUSCULAR; INTRAVENOUS NIGHTLY PRN
Status: DISCONTINUED | OUTPATIENT
Start: 2025-02-26 | End: 2025-03-01 | Stop reason: HOSPADM

## 2025-02-26 RX ORDER — ACETAMINOPHEN 500 MG
1000 TABLET ORAL EVERY 6 HOURS SCHEDULED
Status: DISPENSED | OUTPATIENT
Start: 2025-02-26 | End: 2025-02-27

## 2025-02-26 RX ORDER — FERROUS SULFATE 325(65) MG
325 TABLET ORAL
Status: DISCONTINUED | OUTPATIENT
Start: 2025-02-27 | End: 2025-03-01 | Stop reason: HOSPADM

## 2025-02-26 RX ORDER — SODIUM CHLORIDE 0.9 % (FLUSH) 0.9 %
10 SYRINGE (ML) INJECTION EVERY 12 HOURS SCHEDULED
Status: DISCONTINUED | OUTPATIENT
Start: 2025-02-26 | End: 2025-02-26 | Stop reason: HOSPADM

## 2025-02-26 RX ORDER — MORPHINE SULFATE 2 MG/ML
2 INJECTION, SOLUTION INTRAMUSCULAR; INTRAVENOUS
Status: DISCONTINUED | OUTPATIENT
Start: 2025-02-26 | End: 2025-02-26 | Stop reason: HOSPADM

## 2025-02-26 RX ORDER — CITRIC ACID/SODIUM CITRATE 334-500MG
30 SOLUTION, ORAL ORAL ONCE
Status: COMPLETED | OUTPATIENT
Start: 2025-02-26 | End: 2025-02-26

## 2025-02-26 RX ORDER — NALOXONE HCL 0.4 MG/ML
0.4 VIAL (ML) INJECTION
Status: DISCONTINUED | OUTPATIENT
Start: 2025-02-26 | End: 2025-03-01 | Stop reason: HOSPADM

## 2025-02-26 RX ORDER — IBUPROFEN 600 MG/1
600 TABLET, FILM COATED ORAL EVERY 6 HOURS PRN
Status: DISCONTINUED | OUTPATIENT
Start: 2025-02-26 | End: 2025-02-26

## 2025-02-26 RX ORDER — CARBOPROST TROMETHAMINE 250 UG/ML
250 INJECTION, SOLUTION INTRAMUSCULAR AS NEEDED
Status: DISCONTINUED | OUTPATIENT
Start: 2025-02-26 | End: 2025-02-26 | Stop reason: HOSPADM

## 2025-02-26 RX ORDER — ONDANSETRON 2 MG/ML
4 INJECTION INTRAMUSCULAR; INTRAVENOUS ONCE AS NEEDED
Status: COMPLETED | OUTPATIENT
Start: 2025-02-26 | End: 2025-02-26

## 2025-02-26 RX ORDER — SODIUM CHLORIDE 0.9 % (FLUSH) 0.9 %
10 SYRINGE (ML) INJECTION AS NEEDED
Status: DISCONTINUED | OUTPATIENT
Start: 2025-02-26 | End: 2025-02-26 | Stop reason: HOSPADM

## 2025-02-26 RX ORDER — DIPHENHYDRAMINE HCL 25 MG
25 CAPSULE ORAL EVERY 4 HOURS PRN
Status: DISCONTINUED | OUTPATIENT
Start: 2025-02-26 | End: 2025-03-01 | Stop reason: HOSPADM

## 2025-02-26 RX ORDER — KETOROLAC TROMETHAMINE 30 MG/ML
30 INJECTION, SOLUTION INTRAMUSCULAR; INTRAVENOUS ONCE
Status: COMPLETED | OUTPATIENT
Start: 2025-02-26 | End: 2025-02-26

## 2025-02-26 RX ADMIN — PHENYLEPHRINE HYDROCHLORIDE 100 MCG: 10 INJECTION INTRAVENOUS at 12:48

## 2025-02-26 RX ADMIN — CEFAZOLIN 2000 MG: 2 INJECTION, POWDER, FOR SOLUTION INTRAMUSCULAR; INTRAVENOUS at 12:09

## 2025-02-26 RX ADMIN — ACETAMINOPHEN 1000 MG: 500 TABLET, FILM COATED ORAL at 23:46

## 2025-02-26 RX ADMIN — ONDANSETRON 4 MG: 2 INJECTION, SOLUTION INTRAMUSCULAR; INTRAVENOUS at 12:04

## 2025-02-26 RX ADMIN — Medication 125 ML/HR: at 14:35

## 2025-02-26 RX ADMIN — Medication 30 ML: at 12:03

## 2025-02-26 RX ADMIN — SODIUM CHLORIDE, POTASSIUM CHLORIDE, SODIUM LACTATE AND CALCIUM CHLORIDE 125 ML/HR: 600; 310; 30; 20 INJECTION, SOLUTION INTRAVENOUS at 11:37

## 2025-02-26 RX ADMIN — FAMOTIDINE 20 MG: 10 INJECTION INTRAVENOUS at 12:05

## 2025-02-26 RX ADMIN — PHENYLEPHRINE HYDROCHLORIDE 100 MCG: 10 INJECTION INTRAVENOUS at 13:15

## 2025-02-26 RX ADMIN — KETOROLAC TROMETHAMINE 15 MG: 15 INJECTION, SOLUTION INTRAMUSCULAR; INTRAVENOUS at 21:29

## 2025-02-26 RX ADMIN — MORPHINE SULFATE 200 MCG: 4 INJECTION, SOLUTION INTRAMUSCULAR; INTRAVENOUS at 12:42

## 2025-02-26 RX ADMIN — ONDANSETRON 4 MG: 2 INJECTION INTRAMUSCULAR; INTRAVENOUS at 12:50

## 2025-02-26 RX ADMIN — ACETAMINOPHEN 1000 MG: 500 TABLET, FILM COATED ORAL at 12:03

## 2025-02-26 RX ADMIN — PHENYLEPHRINE HYDROCHLORIDE 100 MCG: 10 INJECTION INTRAVENOUS at 12:54

## 2025-02-26 RX ADMIN — BUPIVACAINE HYDROCHLORIDE 1.6 ML: 7.5 INJECTION, SOLUTION EPIDURAL; RETROBULBAR at 12:42

## 2025-02-26 RX ADMIN — Medication 999 ML/HR: at 13:05

## 2025-02-26 RX ADMIN — CEFAZOLIN 2000 MG: 2 INJECTION, POWDER, FOR SOLUTION INTRAMUSCULAR; INTRAVENOUS at 20:06

## 2025-02-26 RX ADMIN — KETOROLAC TROMETHAMINE 30 MG: 30 INJECTION, SOLUTION INTRAMUSCULAR at 14:11

## 2025-02-26 RX ADMIN — HYDROMORPHONE HYDROCHLORIDE 0.5 MG: 1 INJECTION, SOLUTION INTRAMUSCULAR; INTRAVENOUS; SUBCUTANEOUS at 14:38

## 2025-02-26 RX ADMIN — SODIUM CHLORIDE, POTASSIUM CHLORIDE, SODIUM LACTATE AND CALCIUM CHLORIDE 1000 ML: 600; 310; 30; 20 INJECTION, SOLUTION INTRAVENOUS at 10:35

## 2025-02-26 RX ADMIN — ONDANSETRON 4 MG: 4 TABLET, ORALLY DISINTEGRATING ORAL at 19:25

## 2025-02-26 NOTE — ANESTHESIA POSTPROCEDURE EVALUATION
"Patient: Suzanne Wade    Procedure Summary       Date: 25 Room / Location:  [facility] /  [facility]    Anesthesia Start: 1232 Anesthesia Stop: 1338    Procedure:  SECTION REPEAT WITH SALPINGECTOMY (Abdomen) Diagnosis:       History of       (History of  [Z98.891])    Surgeons: Jhon Palacios MD Provider: Jayy Pickard MD    Anesthesia Type: spinal ASA Status: 2            Anesthesia Type: spinal    Vitals  Vitals Value Taken Time   /65 25 1600   Temp 36.5 °C (97.7 °F) 25 1345   Pulse 62 25 1609   Resp 16 25 1545   SpO2 99 % 25 1609   Vitals shown include unfiled device data.        Post Anesthesia Care and Evaluation    Patient location during evaluation: bedside  Patient participation: complete - patient participated  Level of consciousness: awake and alert  Pain management: adequate    Airway patency: patent  Anesthetic complications: No anesthetic complications    Cardiovascular status: acceptable  Respiratory status: acceptable  Hydration status: acceptable    Comments: /70   Pulse 61   Temp 36.5 °C (97.7 °F) (Oral)   Resp 16   Ht 170.2 cm (67\")   Wt 72.6 kg (160 lb)   LMP 2024   SpO2 99%   Breastfeeding Unknown   BMI 25.06 kg/m²     "

## 2025-02-26 NOTE — PLAN OF CARE
Problem: Adult Inpatient Plan of Care  Goal: Plan of Care Review  Outcome: Progressing  Flowsheets (Taken 2/26/2025 1422)  Progress: no change  Outcome Evaluation: Post op 1220 c/s. VSS. Pain dull at incision. Infant in JAMI with mom and breastfeeding. Father at bedside interacting with patient. Toradol given and 2nd bag of pitocin infusing.  Plan of Care Reviewed With:   patient   spouse  Goal: Patient-Specific Goal (Individualized)  Outcome: Progressing  Flowsheets (Taken 2/26/2025 1422)  Patient/Family-Specific Goals (Include Timeframe): healthy mom and baby at time of discharge.  Individualized Care Needs: Education and getting patient up and moving  Anxieties, Fears or Concerns: anxiety about post op pain and gas pain.  Goal: Absence of Hospital-Acquired Illness or Injury  Outcome: Progressing  Intervention: Identify and Manage Fall Risk  Recent Flowsheet Documentation  Taken 2/26/2025 1345 by Sun Griffin RN  Safety Promotion/Fall Prevention:   fall prevention program maintained   clutter free environment maintained   safety round/check completed  Intervention: Prevent and Manage VTE (Venous Thromboembolism) Risk  Recent Flowsheet Documentation  Taken 2/26/2025 1345 by Sun Griffin RN  VTE Prevention/Management:   bilateral   SCDs (sequential compression devices) on  Goal: Optimal Comfort and Wellbeing  Outcome: Progressing  Goal: Readiness for Transition of Care  Outcome: Progressing     Problem: Skin Injury Risk Increased  Goal: Skin Health and Integrity  Outcome: Progressing  Intervention: Optimize Skin Protection  Recent Flowsheet Documentation  Taken 2/26/2025 1345 by Sun Griffin RN  Head of Bed (HOB) Positioning: HOB at 30-45 degrees   Goal Outcome Evaluation:  Plan of Care Reviewed With: patient, spouse        Progress: no change  Outcome Evaluation: Post op 1220 c/s. VSS. Pain dull at incision. Infant in JAMI with mom and breastfeeding. Father at bedside interacting with patient. Toradol given and  2nd bag of pitocin infusing.

## 2025-02-26 NOTE — ANESTHESIA PREPROCEDURE EVALUATION
Anesthesia Evaluation     Patient summary reviewed and Nursing notes reviewed   NPO Solid Status: > 8 hours  NPO Liquid Status: > 2 hours           Airway   Dental      Pulmonary - negative pulmonary ROS   Cardiovascular   Exercise tolerance: good (4-7 METS)    (-) hypertension      Neuro/Psych- negative ROS  (-) seizures, CVA  GI/Hepatic/Renal/Endo - negative ROS   (-) diabetes    Musculoskeletal (-) negative ROS    Abdominal    Substance History - negative use  (-) alcohol use, drug use     OB/GYN    (+) Pregnant        Other - negative ROS                   Anesthesia Plan    ASA 2     spinal     intravenous induction     Anesthetic plan, risks, benefits, and alternatives have been provided, discussed and informed consent has been obtained with: patient.    CODE STATUS:    Level Of Support Discussed With: Patient  Code Status (Patient has no pulse and is not breathing): CPR (Attempt to Resuscitate)  Medical Interventions (Patient has pulse or is breathing): Full Support

## 2025-02-26 NOTE — L&D DELIVERY NOTE
SECTION FULL OPERATIVE REPORT repeat low-transverse  section  Pre-operative Diagnosis: 39-week intrauterine pregnancy desirous of repeat  with sterilization  Post-operative Diagnosis: Same delivered, nuchal cord x 2  Procedure: Repeat low-transverse  Section bilateral salpingectomy  Anesthesia: Spinal  Surgeon(s): EVER Palacios  Assistant(s): omi Alanis  Surgical assistant was responsible for performing the following activities: Retraction, suction, irrigation, and aiding with delivery of the baby. This skilled assistance was necessary for the success of this case.         Gestational age 39 weeks 1 day  Active Hospital Problems    Diagnosis  POA    **History of  [Z98.891]  Not Applicable     Secondary to breech      H/O:  [Z98.891]  Not Applicable    Maternal anemia in pregnancy, antepartum [O99.019]  Yes    History of postpartum depression [Z87.59, Z86.59]  Not Applicable     Infant    Findings: female infant     Weight: 3450 g (7 lb 9.7 oz)  Length: 19.5 in  Observations/Comments:  panda or 3     9  @ 1 minute /   9  @ 5 minutes          Complications: none  Specimens: Bilateral tubes  EBL: See epic for quantitative blood loss      Description of Procedure:After reviewing risks,benefits, and possible complications, the patient was taken to the operating room where anesthesia was found to be adequate. She was prepped and draped in the usual sterile fashion in the dorsal supine position with a leftward tilt.   A Pfannenstiel skin incision was made with the scalpel and carried through the fat to the underlying layer of fascia. The fascia was then incised in the midline and the incision was extended laterally with Jones scissors. The superior aspect of the fascia was then grasped with Kocher clamps and the underlying rectus muscles were then dissected off bluntly and  with the Jones scissors. The inferior aspect of the fascia was then grasped with Kocher clamps and  dissected off similarly with Jones scissors. The rectus muscles were  and the peritoneum  entered. The peritoneal incision was then carried superiorly and inferiorly taking care to identify the bladder at the lower aspect.   The bladder blade was inserted. The vesicouterine peritoneum was then identified and entered sharply with Metzenbaum scissors and a bladder flap was created.. The bladder blade was reinserted and the uterine incision was made down to the chorionic laeve. This was extended with the bandage scissors. Membranes were then ruptured with an Allis clamp.  The bladder blade was removed and the infant was delivered atraumaticall, after loose nuchal cord x 2 was reduced the infant was moving all four extremities.The nose and mouth were suctioned and the cord was clamped and cut. The infant was taken to the warmer for the   staff, who were present via hospital policy. Cord blood was collected. The placenta was removed, and the uterus was cleared of all clots and debris. The uterine incision was repaired in two layers using 0 chromic suture. The uterus was examined and noted to be hemostatic.  The posterior cul-de-sac and gutters were cleared of all clots and debris. The uterus,tubes and ovaries appeared normal. The uterine incision was  then re-inspected to ensure hemostasis as were all subfascial tissues. Vesic-uterine peritoneum was closed with a 3-0 vicryl suture.  Each tube was grasped at its fimbriated end and removed via the Enseal device by clamping cauterizing and cutting the mesosalpinx all the way up to the insertion of the uterus.  Specimens were sent to pathology for further study all counts were correct and parietal peritoneum was closed with a 3-0 vicryl suture.The fascia was re-approximated in a running fashion using 0-vicryl suture. Three interrupted 0 vicryl sutures were placed laterally and in the midline for reinforcement. The subcutaneous tissue was irrigated,   re-approximated in a running fashion with 3-0 vicryl. The skin was closed with 4-0 vicryl. Bandages were applied. Sánchez was draining clear urine.    Surgical assistant was responsible for performing the following activities: Retraction, suction, irrigation, and aiding with delivery of the baby. This skilled assistance was necessary for the success of this case.       The patient tolerated the procedure well. Sponge, lap and needle counts were correct. The patient was taken to recovery in stable condition.    Jhon Palacios MD    2/26/2025  13:54 EST

## 2025-02-26 NOTE — H&P
History and physical    Admission date (Not on file)     Patient: Suzanne Wade MRN: [id number]   YOB: 1990 Age: 34 y.o. Sex: female     No chief complaint on file.      HPI:    Suzanne Wade is a 34 y.o.,  AT 39w1d admitted for repeat low-transverse  section pregnancy has been complicated by anemia for which she takes ferrous sulfate. Denies vaginal bleeding, leakage of fluid, or contractions. Admits to good fetal movement.       History of     OB History    Para Term  AB Living   4 2 2   1 2   SAB IAB Ectopic Molar Multiple Live Births   1       0 2      # Outcome Date GA Lbr Gabe/2nd Weight Sex Type Anes PTL Lv   4 Current            3 Term 22 39w0d  3470 g (7 lb 10.4 oz) M CS-LTranv Spinal N RAYMUNDO      Birth Comments: Panda in OR1   2 SAB     U SAB   FD   1 Term 19 39w0d  3145 g (6 lb 14.9 oz) F CS-LTranv Spinal N RAYMUNDO     No past medical history on file.  Past Surgical History:   Procedure Laterality Date     SECTION N/A 3/22/2019    Procedure:  SECTION PRIMARY;  Surgeon: Macey Hayden MD;  Location:  [facility];  Service: Obstetrics/Gynecology     SECTION N/A 2022    Procedure:  SECTION REPEAT;  Surgeon: Jhon Palacios MD;  [facility];  Service: Obstetrics/Gynecology;  Laterality: N/A;    SKIN SURGERY      birth escobar removed     WISDOM TOOTH EXTRACTION       No current facility-administered medications on file prior to encounter.     Current Outpatient Medications on File Prior to Encounter   Medication Sig Dispense Refill    clotrimazole-betamethasone (LOTRISONE) 1-0.05 % cream Apply 1 Application topically to the appropriate area as directed 2 (Two) Times a Day. Apply to affected area twice daily 7 days 45 g 1    ferrous sulfate 325 (65 FE) MG tablet Take 1 tablet by mouth Daily With Breakfast. 30 tablet 11    prenatal vitamin (prenatal, CLASSIC, vitamin)  tablet Take  by mouth Daily.         ROS:      Except as outlined in history of physical illness, patient denies any changes in her GYN, , GI systems. All other systems reviewed are negative.  Wt Readings from Last 3 Encounters:   25 72.6 kg (160 lb)   25 73.9 kg (163 lb)   25 73.5 kg (162 lb)     Temp Readings from Last 3 Encounters:   23 98.5 °F (36.9 °C) (Temporal)   22 98.2 °F (36.8 °C) (Oral)   10/31/21 98.2 °F (36.8 °C) (Temporal)     BP Readings from Last 3 Encounters:   25 114/70   25 118/74   25 108/68     Pulse Readings from Last 3 Encounters:   23 56   22 56   10/31/21 72        Last OB US Data (since 8/10/2024)         Value Time User    EFW%ILE  74%ile 10/21/2024  8:35 AM Jhon Palacios MD    AC%ILE  63%ile 10/21/2024  8:35 AM Jhon Palacios MD    Fetal Survey  Incomplete 10/21/2024  8:35 AM Jhon Palacios MD    Placenta location  posterior 10/21/2024  8:35 AM Jhon Palacios MD             OBJECTIVE:     Vitals: There were no vitals filed for this visit.      Appearance/Psychiatric: In no distress   Constitutional: The patient is well nourished   Cardiovascular: She does not have edema. Heart RRR  Respiratory: Respiratory effort is normal. CTAB   Abdomen: Soft, gravid.  Approximately 38 cm, previous  scar is intact and normal  Ext: nontender, no edema. +2/4 bilateral patellar reflexes   Cx; deferred       LOS: 0 days    Jhon Palacios MD   2025    Assessment and Plan:         History of  [Z98.891]  Term intrauterine pregnancy  Desirous of repeat low-transverse  section  Maternal anemia continue prenatal vitamins and iron following delivery  History of postpartum depression we will start Zoloft following delivery  Desirous of permanent sterilization, consents have been signed  Risk benefits reviewed

## 2025-02-26 NOTE — ANESTHESIA PROCEDURE NOTES
Spinal Block      Patient reassessed immediately prior to procedure    Patient location during procedure: OB  Performed By  CRNA/CAA: Gerry Cornelius CRNA  Preanesthetic Checklist  Completed: patient identified, IV checked, site marked, risks and benefits discussed, surgical consent, monitors and equipment checked, pre-op evaluation and timeout performed  Spinal Block Prep:  Patient Position:sitting  Sterile Tech:gloves, mask and sterile barriers  Prep:Chloraprep  Patient Monitoring:blood pressure monitoring and continuous pulse oximetry    Spinal Block Procedure  Approach:midline  Guidance:landmark technique  Location:L3-L4  Needle Type:Camila  Needle Gauge:25 G  Placement of Spinal needle event:cerebrospinal fluid aspirated  Paresthesia: no  Fluid Appearance:clear  Medications: Morphine sulfate (PF) injection - Spinal   200 mcg - 2/26/2025 12:42:00 PM  bupivacaine PF (MARCAINE) 0.75 % injection - Spinal   1.6 mL - 2/26/2025 12:42:00 PM   Post Assessment  Patient Tolerance:patient tolerated the procedure well with no apparent complications  Complications no

## 2025-02-27 LAB
DEPRECATED RDW RBC AUTO: 40.5 FL (ref 37–54)
ERYTHROCYTE [DISTWIDTH] IN BLOOD BY AUTOMATED COUNT: 12.1 % (ref 12.3–15.4)
HCT VFR BLD AUTO: 32.5 % (ref 34–46.6)
HGB BLD-MCNC: 10.6 G/DL (ref 12–15.9)
MCH RBC QN AUTO: 29.8 PG (ref 26.6–33)
MCHC RBC AUTO-ENTMCNC: 32.6 G/DL (ref 31.5–35.7)
MCV RBC AUTO: 91.3 FL (ref 79–97)
PLATELET # BLD AUTO: 171 10*3/MM3 (ref 140–450)
PMV BLD AUTO: 10.6 FL (ref 6–12)
RBC # BLD AUTO: 3.56 10*6/MM3 (ref 3.77–5.28)
WBC NRBC COR # BLD AUTO: 8.5 10*3/MM3 (ref 3.4–10.8)

## 2025-02-27 PROCEDURE — 25010000002 CEFAZOLIN PER 500 MG: Performed by: OBSTETRICS & GYNECOLOGY

## 2025-02-27 PROCEDURE — 25010000002 KETOROLAC TROMETHAMINE PER 15 MG: Performed by: OBSTETRICS & GYNECOLOGY

## 2025-02-27 PROCEDURE — 85027 COMPLETE CBC AUTOMATED: CPT | Performed by: OBSTETRICS & GYNECOLOGY

## 2025-02-27 RX ORDER — HYDROCORTISONE 25 MG/G
CREAM TOPICAL 3 TIMES DAILY PRN
Status: CANCELLED | OUTPATIENT
Start: 2025-02-27

## 2025-02-27 RX ORDER — SIMETHICONE 80 MG
80 TABLET,CHEWABLE ORAL 4 TIMES DAILY PRN
Status: CANCELLED | OUTPATIENT
Start: 2025-02-27

## 2025-02-27 RX ORDER — ONDANSETRON 2 MG/ML
4 INJECTION INTRAMUSCULAR; INTRAVENOUS EVERY 6 HOURS PRN
Status: CANCELLED | OUTPATIENT
Start: 2025-02-27

## 2025-02-27 RX ORDER — HYDROXYZINE HYDROCHLORIDE 50 MG/1
50 TABLET, FILM COATED ORAL EVERY 6 HOURS PRN
Status: CANCELLED | OUTPATIENT
Start: 2025-02-27

## 2025-02-27 RX ORDER — PROMETHAZINE HYDROCHLORIDE 12.5 MG/1
12.5 SUPPOSITORY RECTAL EVERY 6 HOURS PRN
Status: CANCELLED | OUTPATIENT
Start: 2025-02-27

## 2025-02-27 RX ORDER — PROMETHAZINE HYDROCHLORIDE 25 MG/1
25 TABLET ORAL EVERY 6 HOURS PRN
Status: CANCELLED | OUTPATIENT
Start: 2025-02-27

## 2025-02-27 RX ORDER — ACETAMINOPHEN 500 MG
1000 TABLET ORAL EVERY 6 HOURS
Status: CANCELLED | OUTPATIENT
Start: 2025-02-27 | End: 2025-02-28

## 2025-02-27 RX ORDER — PROMETHAZINE HYDROCHLORIDE 12.5 MG/1
12.5 TABLET ORAL EVERY 4 HOURS PRN
Status: CANCELLED | OUTPATIENT
Start: 2025-02-27

## 2025-02-27 RX ORDER — OXYTOCIN/0.9 % SODIUM CHLORIDE 30/500 ML
125 PLASTIC BAG, INJECTION (ML) INTRAVENOUS ONCE AS NEEDED
Status: CANCELLED | OUTPATIENT
Start: 2025-02-27

## 2025-02-27 RX ORDER — ACETAMINOPHEN 325 MG/1
650 TABLET ORAL EVERY 6 HOURS
Status: CANCELLED | OUTPATIENT
Start: 2025-02-28

## 2025-02-27 RX ORDER — OXYCODONE HYDROCHLORIDE 5 MG/1
5 TABLET ORAL EVERY 4 HOURS PRN
Status: CANCELLED | OUTPATIENT
Start: 2025-02-27 | End: 2025-03-04

## 2025-02-27 RX ORDER — METOCLOPRAMIDE 10 MG/1
10 TABLET ORAL ONCE
Status: CANCELLED | OUTPATIENT
Start: 2025-02-27 | End: 2025-02-27

## 2025-02-27 RX ORDER — OXYCODONE HYDROCHLORIDE 10 MG/1
10 TABLET ORAL EVERY 4 HOURS PRN
Status: CANCELLED | OUTPATIENT
Start: 2025-02-27 | End: 2025-03-04

## 2025-02-27 RX ORDER — ONDANSETRON 4 MG/1
4 TABLET, ORALLY DISINTEGRATING ORAL EVERY 8 HOURS PRN
Status: CANCELLED | OUTPATIENT
Start: 2025-02-27

## 2025-02-27 RX ADMIN — SIMETHICONE CHEW TAB 80 MG 80 MG: 80 TABLET ORAL at 14:58

## 2025-02-27 RX ADMIN — PRENATAL VITAMINS-IRON FUMARATE 27 MG IRON-FOLIC ACID 0.8 MG TABLET 1 TABLET: at 09:43

## 2025-02-27 RX ADMIN — CEFAZOLIN 2000 MG: 2 INJECTION, POWDER, FOR SOLUTION INTRAMUSCULAR; INTRAVENOUS at 03:25

## 2025-02-27 RX ADMIN — ACETAMINOPHEN 1000 MG: 500 TABLET, FILM COATED ORAL at 06:16

## 2025-02-27 RX ADMIN — ACETAMINOPHEN 650 MG: 325 TABLET, FILM COATED ORAL at 18:45

## 2025-02-27 RX ADMIN — MAGNESIUM HYDROXIDE 10 ML: 2400 SUSPENSION ORAL at 18:51

## 2025-02-27 RX ADMIN — DOCUSATE SODIUM 100 MG: 100 CAPSULE, LIQUID FILLED ORAL at 18:48

## 2025-02-27 RX ADMIN — SIMETHICONE CHEW TAB 80 MG 80 MG: 80 TABLET ORAL at 09:43

## 2025-02-27 RX ADMIN — IBUPROFEN 600 MG: 600 TABLET ORAL at 22:37

## 2025-02-27 RX ADMIN — SERTRALINE HYDROCHLORIDE 50 MG: 50 TABLET, FILM COATED ORAL at 11:51

## 2025-02-27 RX ADMIN — SIMETHICONE CHEW TAB 80 MG 80 MG: 80 TABLET ORAL at 21:41

## 2025-02-27 RX ADMIN — FERROUS SULFATE TAB 325 MG (65 MG ELEMENTAL FE) 325 MG: 325 (65 FE) TAB at 09:43

## 2025-02-27 RX ADMIN — IBUPROFEN 600 MG: 600 TABLET ORAL at 17:00

## 2025-02-27 RX ADMIN — KETOROLAC TROMETHAMINE 15 MG: 15 INJECTION, SOLUTION INTRAMUSCULAR; INTRAVENOUS at 03:25

## 2025-02-27 RX ADMIN — ACETAMINOPHEN 1000 MG: 500 TABLET, FILM COATED ORAL at 12:12

## 2025-02-27 RX ADMIN — KETOROLAC TROMETHAMINE 15 MG: 15 INJECTION, SOLUTION INTRAMUSCULAR; INTRAVENOUS at 09:43

## 2025-02-27 NOTE — PLAN OF CARE
Goal Outcome Evaluation:  Plan of Care Reviewed With: patient        Progress: improving     Vital signs stable. Fundus firm, lochia light. Incision dressing clean, dry and intact. Pain is controlled with scheduled medication. IV antibiotics completed this shift. Ambulated to bathroom without difficulty. Sánchez cath dc'ed and pt is due to void. Pt able to drink water and eat crackers now.

## 2025-02-27 NOTE — PROGRESS NOTES
" POSTPARTUM ROUNDS    Chief complaint: post C/S concerns  SUBJECTIVE:  Patient appears comfortable, and pain seems to be controlled with by mouth medicines.  She is ambulating. .  Discussed advancing activity and diet.       ROS:  Pulm: neg for soa  GI: neg for heavy bleeding  Musculoskel: neg for leg pain      OBJECTIVE:  Vital Signs: /86 (BP Location: Right arm, Patient Position: Sitting)   Pulse 57   Temp 98.1 °F (36.7 °C) (Oral)   Resp 16   Ht 170.2 cm (67\")   Wt 72.6 kg (160 lb)   LMP 2024   SpO2 98%   Breastfeeding Yes   BMI 25.06 kg/m²     Intake/Output Summary (Last 24 hours) at 2025 1009  Last data filed at 2025 0325  Gross per 24 hour   Intake 1200 ml   Output 754 ml   Net 446 ml       Constitutional:  The patient is well nourished.  Cardiovascular:  RRR  Resp:  nonlabored breathing  The incision is normal  Gastrointestinal:  fundus firm below umbilicus  Extremities:  no edema,no evidence dvt    LABS / IMAGING:  Lab Results (last 24 hours)       Procedure Component Value Units Date/Time    CBC (No Diff) [[id number]]  (Abnormal) Collected: 25 0705    Specimen: Blood Updated: 25 0908     WBC 8.50 10*3/mm3      RBC 3.56 10*6/mm3      Hemoglobin 10.6 g/dL      Hematocrit 32.5 %      MCV 91.3 fL      MCH 29.8 pg      MCHC 32.6 g/dL      RDW 12.1 %      RDW-SD 40.5 fl      MPV 10.6 fL      Platelets 171 10*3/mm3     Tissue Pathology Exam [[id number]] Collected: 25 1311    Specimen: Tissue from Fallopian Tube, Left; Tissue from Fallopian Tube, Right Updated: 25 1501    Treponema pallidum AB w/Reflex RPR [[id number]]  (Normal) Collected: 25 1034    Specimen: Blood Updated: 25 1126     Treponemal AB Total Non-Reactive    Narrative:      Reactive results will reflex RPR testing.    CBC & Differential [[id number]]  (Abnormal) Collected: 25 1034    Specimen: Blood Updated: 25 1058    Narrative:      The following orders were created for " panel order CBC & Differential.  Procedure                               Abnormality         Status                     ---------                               -----------         ------                     CBC Auto Differential[[id number]]        Abnormal            Final result                 Please view results for these tests on the individual orders.    CBC Auto Differential [[id number]]  (Abnormal) Collected: 25 1034    Specimen: Blood Updated: 25 1058     WBC 7.83 10*3/mm3      RBC 4.12 10*6/mm3      Hemoglobin 12.6 g/dL      Hematocrit 37.9 %      MCV 92.0 fL      MCH 30.6 pg      MCHC 33.2 g/dL      RDW 12.5 %      RDW-SD 41.8 fl      MPV 10.5 fL      Platelets 198 10*3/mm3      Neutrophil % 73.2 %      Lymphocyte % 17.2 %      Monocyte % 7.9 %      Eosinophil % 0.9 %      Basophil % 0.5 %      Immature Grans % 0.3 %      Neutrophils, Absolute 5.73 10*3/mm3      Lymphocytes, Absolute 1.35 10*3/mm3      Monocytes, Absolute 0.62 10*3/mm3      Eosinophils, Absolute 0.07 10*3/mm3      Basophils, Absolute 0.04 10*3/mm3      Immature Grans, Absolute 0.02 10*3/mm3      nRBC 0.0 /100 WBC               OB History    Para Term  AB Living   4 3 3   1 3   SAB IAB Ectopic Molar Multiple Live Births   1       0 3      # Outcome Date GA Lbr Gabe/2nd Weight Sex Type Anes PTL Lv   4 Term 25 39w1d  3450 g (7 lb 9.7 oz) F CS-LTranv Spinal N RAYMUNDO      Birth Comments: panda or 3   3 Term 22 39w0d  3470 g (7 lb 10.4 oz) M CS-LTranv Spinal N RAYMUNDO      Birth Comments: Panda in OR1   2 SAB     U SAB   FD   1 Term 19 39w0d  3145 g (6 lb 14.9 oz) F CS-LTranv Spinal N RAYMUNDO          ASSESSMENT AND PLAN:    Principal Problem:    History of       Overview: Secondary to breech  Active Problems:    History of postpartum depression    Maternal anemia in pregnancy, antepartum    H/O:        Diagnoses and all orders for this visit:    1. History of   Overview:  Secondary to  breech    Orders:  -     Tissue Pathology Exam; Standing  -     Tissue Pathology Exam    Other orders  -     Admit To Obstetrics Inpatient; Standing  -     Cancel: Code Status and Medical Interventions: CPR (Attempt to Resuscitate); Full Support; Standing  -     Cancel: Obtain informed consent; Standing  -     Cancel: Vital Signs q 4 while awake; Standing  -     Cancel: Vital Signs Per hospital policy; Standing  -     Cancel: Continuous Fetal Monitoring With NST on Admission and Prior to Initiation of Oxytocin.; Standing  -     Cancel: External Uterine Contraction Monitoring; Standing  -     Cancel: Notify Physician (specified); Standing  -     Cancel: Notify physician for hyperstimulus (per hospital algorithm); Standing  -     Cancel: Notify physician if membranes ruptured, bleeding greater than 1 pad an hour, fetal heart tone abnormality, and severe pain; Standing  -     Cancel: Up Ad Cony; Standing  -     Cancel: Initiate Group Beta Strep (GBS) Prophylaxis Protocol, If Criteria Met; Standing  -     Cancel: Insert Indwelling Urinary Catheter; Standing  -     Cancel: Assess Need for Indwelling Urinary Catheter - Follow Removal Protocol; Standing  -     Cancel: Urinary Catheter Care; Standing  -     Cancel: Abdominal Prep with Clippers; Standing  -     Cancel: Chlorhexadine Skin Prep Unless Otherwise Indicated; Standing  -     Cancel: SCD (sequential compression devices); Standing  -     Cancel: Document Gatorade Consumption Prior to Admission (Yes or No); Standing  -     Cancel: NPO Diet NPO Type: Ice Chips; Standing  -     Cancel: Inpatient Consult to Anesthesiology; Standing  -     Type & Screen; Standing  -     Treponema pallidum AB w/Reflex RPR; Standing  -     CBC & Differential; Standing  -     Cancel: POCT protein, urine, qualitative, dipstick; Standing  -     Cancel: Insert Peripheral IV; Standing  -     Cancel: Saline Lock & Maintain IV Access; Standing  -     Discontinue: sodium chloride 0.9 % flush 10  mL  -     Discontinue: sodium chloride 0.9 % flush 10 mL  -     Discontinue: sodium chloride 0.9 % infusion 40 mL  -     Discontinue: lidocaine (XYLOCAINE) 1 % injection 0.5 mL  -     lactated ringers bolus 1,000 mL  -     acetaminophen (TYLENOL) tablet 1,000 mg  -     Admit To Obstetrics Inpatient  -     Cancel: Code Status and Medical Interventions: CPR (Attempt to Resuscitate); Full Support  -     Cancel: Obtain informed consent  -     Cancel: Vital Signs q 4 while awake  -     Cancel: Vital Signs q 4 while awake  -     Cancel: Vital Signs q 4 while awake  -     Cancel: Vital Signs q 4 while awake  -     Cancel: Vital Signs Per hospital policy  -     Cancel: Continuous Fetal Monitoring With NST on Admission and Prior to Initiation of Oxytocin.  -     Cancel: External Uterine Contraction Monitoring  -     Cancel: Notify Physician (specified)  -     Cancel: Notify physician for hyperstimulus (per hospital algorithm)  -     Cancel: Notify physician if membranes ruptured, bleeding greater than 1 pad an hour, fetal heart tone abnormality, and severe pain  -     Cancel: Up Ad Cony  -     Cancel: Initiate Group Beta Strep (GBS) Prophylaxis Protocol, If Criteria Met  -     Cancel: Insert Indwelling Urinary Catheter  -     Cancel: Assess Need for Indwelling Urinary Catheter - Follow Removal Protocol  -     Cancel: Urinary Catheter Care  -     Cancel: Urinary Catheter Care  -     Cancel: Abdominal Prep with Clippers  -     Cancel: Chlorhexadine Skin Prep Unless Otherwise Indicated  -     Cancel: SCD (sequential compression devices)  -     Cancel: Document Gatorade Consumption Prior to Admission (Yes or No)  -     Cancel: NPO Diet NPO Type: Ice Chips  -     Cancel: Inpatient Consult to Anesthesiology  -     Type & Screen  -     Treponema pallidum AB w/Reflex RPR  -     CBC & Differential  -     Cancel: POCT protein, urine, qualitative, dipstick  -     Cancel: Insert Peripheral IV  -     Cancel: Saline Lock & Maintain IV  Access  -     Discontinue: lactated ringers infusion  -     Cancel: Nurse may remove epidural catheter after delivery.; Standing  -     Cancel: Notify physician for the following conditions:; Standing  -     Sod Citrate-Citric Acid (BICITRA) oral solution 30 mL  -     ondansetron (ZOFRAN) injection 4 mg  -     famotidine (PEPCID) injection 20 mg  -     Transfer to postpartum when discharge criteria met.; Standing  -     Cancel: Nurse may remove epidural catheter after delivery.  -     Cancel: Notify physician for the following conditions:  -     ceFAZolin 2000 mg IVPB in 100 mL NS (MBP)  -     oxytocin (PITOCIN) 30 units in 0.9% sodium chloride 500 mL (premix)  -     oxytocin (PITOCIN) 30 units in 0.9% sodium chloride 500 mL (premix)  -     ketorolac (TORADOL) injection 30 mg  -     Discontinue: morphine injection 2 mg  -     Discontinue: methylergonovine (METHERGINE) injection 200 mcg  -     Discontinue: carboprost (HEMABATE) injection 250 mcg  -     Discontinue: ondansetron ODT (ZOFRAN-ODT) disintegrating tablet 4 mg  -     Discontinue: ondansetron (ZOFRAN) injection 4 mg  -     Vital Signs; Standing  -     IV Site Care; Standing  -     Cancel: Oxygen Therapy- Nasal Cannula; 2 LPM; Standing  -     Continuous Pulse Oximetry; Standing  -     Respirations; Standing  -     If respiratory is less than 8/min, see Narcan order below. Notify Anesthesiologist STAT.; Standing  -     Cancel: Blood Pressure and Pulse Every 4 Hours; Standing  -     Blood Gas, Arterial -; Standing  -     Cancel: Activity & Removal of Sánchez Catheter, Per Obstetrician; Standing  -     Cancel: Notify Anesthesiologist for Any Questions / Problems; Standing  -     Cancel: Notify Anesthesia for Temp Over 101.4F; Standing  -     Ambu Bag at Bedside; Standing  -     ondansetron (ZOFRAN) injection 4 mg  -     diphenhydrAMINE (BENADRYL) capsule 25 mg  -     diphenhydrAMINE (BENADRYL) injection 25 mg  -     diphenhydrAMINE (BENADRYL) injection 25 mg  -      naloxone (NARCAN) injection 0.2 mg  -     Discontinue: HYDROmorphone (DILAUDID) injection 0.5 mg  -     morphine injection 2 mg  -     Transfer Patient; Standing  -     prenatal vitamin tablet 1 tablet  -     ferrous sulfate tablet 325 mg  -     Code Status and Medical Interventions: CPR (Attempt to Resuscitate); Full Support; Standing  -     Intake and Output; Standing  -     Saline lock IV if tolerating PO; Standing  -     Remove Vaginal Packing; Standing  -     Notify physician (specify); Standing  -     Up in Chair; Standing  -     Vital Signs per hospital policy; Standing  -     Turn cough deep breathe; Standing  -     Incentive Spirometry; Standing  -     Advance Diet As Tolerated -; Standing  -     CBC (No Diff); Standing  -     Discontinue: ibuprofen (ADVIL,MOTRIN) tablet 600 mg  -     oxyCODONE-acetaminophen (PERCOCET) 5-325 MG per tablet 1 tablet  -     morphine injection 1 mg  -     naloxone (NARCAN) injection 0.4 mg  -     HYDROmorphone (DILAUDID) injection 0.5 mg  -     naloxone (NARCAN) injection 0.1 mg  -     ceFAZolin 2000 mg IVPB in 100 mL NS (MBP)  -     diphenhydrAMINE (BENADRYL) capsule 25 mg  -     diphenhydrAMINE (BENADRYL) injection 25 mg  -     docusate sodium (COLACE) capsule 100 mg  -     bisacodyl (DULCOLAX) suppository 10 mg  -     magnesium hydroxide (MILK OF MAGNESIA) suspension 10 mL  -     simethicone (MYLICON) chewable tablet 80 mg  -     ondansetron ODT (ZOFRAN-ODT) disintegrating tablet 4 mg  -     ondansetron (ZOFRAN) injection 4 mg  -     Place Sequential Compression Device; Standing  -     Maintain Sequential Compression Device; Standing  -     Discontinue: sertraline (ZOLOFT) tablet 50 mg  -     Notify Physician (specified); Standing  -     Vital Signs Per hospital policy; Standing  -     Strict Bed Rest; Standing  -     Fundal & Lochia Check; Standing  -     Fundal & Lochia Check; Standing  -     Diet: Regular/House; Fluid Consistency: Thin (IDDSI 0); Standing  -     Advance  Diet As Tolerated -; Standing  -     Vital Signs  -     Cancel: Oxygen Therapy- Nasal Cannula; 2 LPM  -     Respirations  -     If respiratory is less than 8/min, see Narcan order below. Notify Anesthesiologist STAT.  -     Cancel: Blood Pressure and Pulse Every 4 Hours  -     Cancel: Activity & Removal of Sánchez Catheter, Per Obstetrician  -     Cancel: Notify Anesthesiologist for Any Questions / Problems  -     Cancel: Notify Anesthesia for Temp Over 101.4F  -     Ambu Bag at Bedside  -     Notify Physician (specified)  -     Vital Signs Per hospital policy  -     Strict Bed Rest  -     Fundal & Lochia Check  -     Fundal & Lochia Check  -     Diet: Regular/House; Fluid Consistency: Thin (IDDSI 0)  -     Advance Diet As Tolerated -  -     oxytocin (PITOCIN) 30 units in 0.9% sodium chloride 500 mL (premix)  -     Transfer Patient  -     Code Status and Medical Interventions: CPR (Attempt to Resuscitate); Full Support  -     Intake and Output  -     Saline lock IV if tolerating PO  -     Remove Vaginal Packing  -     Vital Signs per hospital policy  -     Turn cough deep breathe  -     Incentive Spirometry  -     Incentive Spirometry  -     Incentive Spirometry  -     Incentive Spirometry  -     Incentive Spirometry  -     Incentive Spirometry  -     Incentive Spirometry  -     Incentive Spirometry  -     Incentive Spirometry  -     Advance Diet As Tolerated -  -     Maintain Sequential Compression Device  -     Transfer to postpartum when discharge criteria met.  -     Continuous Pulse Oximetry  -     IV Site Care  -     Notify physician (specify)  -     Place Sequential Compression Device  -     Incentive Spirometry  -     Discontinue: acetaminophen (TYLENOL) tablet 650 mg  -     acetaminophen (TYLENOL) tablet 1,000 mg  -     Discontinue: acetaminophen (TYLENOL) tablet 650 mg  -     acetaminophen (TYLENOL) tablet 650 mg  -     Incentive Spirometry  -     ibuprofen (ADVIL,MOTRIN) tablet 600 mg  -     Discontinue:  ketorolac (TORADOL) injection 30 mg  -     ketorolac (TORADOL) injection 15 mg  -     Incentive Spirometry  -     CBC (No Diff)  -     Intake and Output  -     Intake and Output  -     Fundal & Lochia Check  -     Fundal & Lochia Check  -     Incentive Spirometry  -     Incentive Spirometry  -     sertraline (ZOLOFT) tablet 50 mg  -     Incentive Spirometry           Patient is postop day 1 from LTCS  Patient is doing well  Advance diet as tolerated      Regular diet   Encourage ambulation     Jhon Palacios MD    2/27/2025  10:09 EST

## 2025-02-27 NOTE — PLAN OF CARE
Goal Outcome Evaluation:  Plan of Care Reviewed With: patient        Progress: improving     Vss. Mom resting between care & pain controlled w/sched meds. Pt up ad mychal & voiding spontaneously. PIV d/c & drsng on abd removed during shower this shift. No other changes.

## 2025-02-28 PROBLEM — D62 ANEMIA DUE TO ACUTE BLOOD LOSS: Status: ACTIVE | Noted: 2024-12-12

## 2025-02-28 PROBLEM — Z98.891 S/P CESAREAN SECTION: Status: ACTIVE | Noted: 2025-02-28

## 2025-02-28 LAB
CYTO UR: NORMAL
LAB AP CASE REPORT: NORMAL
PATH REPORT.FINAL DX SPEC: NORMAL
PATH REPORT.GROSS SPEC: NORMAL

## 2025-02-28 RX ADMIN — ACETAMINOPHEN 650 MG: 325 TABLET, FILM COATED ORAL at 00:46

## 2025-02-28 RX ADMIN — SERTRALINE HYDROCHLORIDE 50 MG: 50 TABLET, FILM COATED ORAL at 08:08

## 2025-02-28 RX ADMIN — ACETAMINOPHEN 650 MG: 325 TABLET, FILM COATED ORAL at 20:05

## 2025-02-28 RX ADMIN — IBUPROFEN 600 MG: 600 TABLET ORAL at 10:59

## 2025-02-28 RX ADMIN — MAGNESIUM HYDROXIDE 10 ML: 2400 SUSPENSION ORAL at 13:42

## 2025-02-28 RX ADMIN — IBUPROFEN 600 MG: 600 TABLET ORAL at 16:01

## 2025-02-28 RX ADMIN — ACETAMINOPHEN 650 MG: 325 TABLET, FILM COATED ORAL at 06:39

## 2025-02-28 RX ADMIN — PRENATAL VITAMINS-IRON FUMARATE 27 MG IRON-FOLIC ACID 0.8 MG TABLET 1 TABLET: at 08:08

## 2025-02-28 RX ADMIN — MUPIROCIN 1 APPLICATION: 20 OINTMENT TOPICAL at 16:01

## 2025-02-28 RX ADMIN — ACETAMINOPHEN 650 MG: 325 TABLET, FILM COATED ORAL at 13:02

## 2025-02-28 RX ADMIN — SIMETHICONE CHEW TAB 80 MG 80 MG: 80 TABLET ORAL at 04:40

## 2025-02-28 RX ADMIN — FERROUS SULFATE TAB 325 MG (65 MG ELEMENTAL FE) 325 MG: 325 (65 FE) TAB at 08:08

## 2025-02-28 RX ADMIN — IBUPROFEN 600 MG: 600 TABLET ORAL at 04:40

## 2025-02-28 RX ADMIN — SIMETHICONE CHEW TAB 80 MG 80 MG: 80 TABLET ORAL at 20:05

## 2025-02-28 RX ADMIN — SIMETHICONE CHEW TAB 80 MG 80 MG: 80 TABLET ORAL at 13:02

## 2025-02-28 RX ADMIN — IBUPROFEN 600 MG: 600 TABLET ORAL at 23:15

## 2025-02-28 NOTE — LACTATION NOTE
Pt reports she has right tender nipple and it is inverted.  educated pt on nipple stretching to jerrell nipple before latching. Pt was able to jerrell nipple easily and baby latched well. Educated on importance of deep latching and was to achieve it. Pt's right nipple is pink but not skin breakdown. LC will request APNO from pharmacy. Educated on nipple care. Encouraged pt to BF for 10-15 min on both breasts at least every 2-3 hours. Call LC as needed.    Lactation Consult Note    Evaluation Completed: 2025 11:02 EST  Patient Name: Suzanne Wade  :  1990  MRN:  [id number]     REFERRAL  INFORMATION:                                         DELIVERY HISTORY:        Skin to skin initiation date/time:      Skin to skin end date/time:           MATERNAL ASSESSMENT:                               INFANT ASSESSMENT:  Information for the patient's :  MauroLio [[id number]]   No past medical history on file.                                                                                                  MATERNAL INFANT FEEDING:                                                                       EQUIPMENT TYPE:                                 BREAST PUMPING:          LACTATION REFERRALS:

## 2025-02-28 NOTE — PLAN OF CARE
Goal Outcome Evaluation:  Plan of Care Reviewed With: patient        Progress: improving             VSS, fundus and lochia WDL, incision with steristrips  Problem: Adult Inpatient Plan of Care  Goal: Plan of Care Review  Outcome: Progressing  Flowsheets (Taken 2/28/2025 0527)  Progress: improving  Plan of Care Reviewed With: patient  Goal: Patient-Specific Goal (Individualized)  Outcome: Progressing  Goal: Absence of Hospital-Acquired Illness or Injury  Outcome: Progressing  Intervention: Identify and Manage Fall Risk  Recent Flowsheet Documentation  Taken 2/28/2025 0440 by Radha Alarcon RN  Safety Promotion/Fall Prevention: safety round/check completed  Taken 2/28/2025 0237 by Radha Alarcon RN  Safety Promotion/Fall Prevention: safety round/check completed  Taken 2/28/2025 0133 by Radha Alarcon RN  Safety Promotion/Fall Prevention: safety round/check completed  Taken 2/28/2025 0046 by Radha Alarcon RN  Safety Promotion/Fall Prevention: safety round/check completed  Taken 2/27/2025 2330 by Radha Alarcon RN  Safety Promotion/Fall Prevention: safety round/check completed  Taken 2/27/2025 2237 by Radha Alarcon RN  Safety Promotion/Fall Prevention: safety round/check completed  Taken 2/27/2025 2014 by Radha Alarcon RN  Safety Promotion/Fall Prevention: safety round/check completed  Intervention: Prevent Skin Injury  Recent Flowsheet Documentation  Taken 2/27/2025 2014 by Radha Alarcon RN  Body Position: position changed independently  Goal: Optimal Comfort and Wellbeing  Outcome: Progressing  Intervention: Monitor Pain and Promote Comfort  Recent Flowsheet Documentation  Taken 2/28/2025 0440 by Radha Alarcon RN  Pain Management Interventions: pain medication given  Taken 2/28/2025 0046 by Radah Alarcon RN  Pain Management Interventions: pain medication given  Taken 2/27/2025 2237 by Radha Alarcon RN  Pain Management Interventions: pain medication given  Intervention: Provide  Person-Centered Care  Recent Flowsheet Documentation  Taken 2/27/2025 2014 by Radha Alarcon RN  Trust Relationship/Rapport:   care explained   emotional support provided   questions answered   questions encouraged   reassurance provided  Goal: Readiness for Transition of Care  Outcome: Progressing     Problem: Skin Injury Risk Increased  Goal: Skin Health and Integrity  Outcome: Progressing  Intervention: Optimize Skin Protection  Recent Flowsheet Documentation  Taken 2/27/2025 2014 by Radha Alarcon RN  Activity Management: up ad mychal  Head of Bed (HOB) Positioning: HOB elevated     Problem: Anesthesia/Analgesia, Neuraxial  Goal: Safe, Effective Infusion Delivery  Outcome: Progressing  Goal: Stable Patient-Fetal Status  Outcome: Progressing  Goal: Absence of Infection Signs and Symptoms  Outcome: Progressing  Goal: Nausea and Vomiting Relief  Outcome: Progressing  Goal: Optimal Pain Control and Function  Outcome: Progressing  Intervention: Prevent or Manage Pain  Recent Flowsheet Documentation  Taken 2/28/2025 0440 by Radha Alarcon RN  Pain Management Interventions: pain medication given  Taken 2/28/2025 0046 by Radha Alarcon RN  Pain Management Interventions: pain medication given  Taken 2/27/2025 2237 by Radha Alarcon RN  Pain Management Interventions: pain medication given  Goal: Effective Oxygenation and Ventilation  Outcome: Progressing  Intervention: Optimize Oxygenation and Ventilation  Recent Flowsheet Documentation  Taken 2/27/2025 2014 by Radha Alarcon RN  Body Position: position changed independently  Head of Bed (HOB) Positioning: HOB elevated  Goal: Baseline Motor Function Return  Outcome: Progressing  Intervention: Optimize Sensorimotor Function and Safety  Recent Flowsheet Documentation  Taken 2/28/2025 0440 by Radha Alarcon RN  Safety Promotion/Fall Prevention: safety round/check completed  Taken 2/28/2025 0237 by Radha Alarcon RN  Safety Promotion/Fall Prevention: safety  round/check completed  Taken 2025 0133 by Radha Alarcon RN  Safety Promotion/Fall Prevention: safety round/check completed  Taken 2025 0046 by Radha Alarcon RN  Safety Promotion/Fall Prevention: safety round/check completed  Taken 2025 2330 by Radha Alarcon RN  Safety Promotion/Fall Prevention: safety round/check completed  Taken 2025 223 by Radha Alarcon RN  Safety Promotion/Fall Prevention: safety round/check completed  Taken 2025 by Radha Alarcon RN  Safety Promotion/Fall Prevention: safety round/check completed  Goal: Effective Urinary Elimination  Outcome: Progressing     Problem: Postpartum ( Delivery)  Goal: Successful Parent Role Transition  Outcome: Progressing  Goal: Hemostasis  Outcome: Progressing  Goal: Effective Bowel Elimination  Outcome: Progressing  Goal: Fluid and Electrolyte Balance  Outcome: Progressing  Goal: Absence of Infection Signs and Symptoms  Outcome: Progressing  Goal: Anesthesia/Sedation Recovery  Outcome: Progressing  Intervention: Optimize Anesthesia Recovery  Recent Flowsheet Documentation  Taken 2025 0440 by Radha Alarcon RN  Safety Promotion/Fall Prevention: safety round/check completed  Taken 2025 0237 by Radha Aalrcon RN  Safety Promotion/Fall Prevention: safety round/check completed  Taken 2025 0133 by Radha Alarcon RN  Safety Promotion/Fall Prevention: safety round/check completed  Taken 2025 0046 by Radha Alarcon RN  Safety Promotion/Fall Prevention: safety round/check completed  Taken 2025 2330 by Radha Alarcon RN  Safety Promotion/Fall Prevention: safety round/check completed  Taken 2025 by Radha Alarcon RN  Safety Promotion/Fall Prevention: safety round/check completed  Taken 2025 by Radha Alarcon RN  Safety Promotion/Fall Prevention: safety round/check completed  Goal: Optimal Pain Control and Function  Outcome: Progressing  Intervention: Prevent or  Manage Pain  Recent Flowsheet Documentation  Taken 2/28/2025 0440 by Radha Alarcon, RN  Pain Management Interventions: pain medication given  Taken 2/28/2025 0046 by Radha Alarcon RN  Pain Management Interventions: pain medication given  Taken 2/27/2025 2237 by Radha Alarcon RN  Pain Management Interventions: pain medication given  Taken 2/27/2025 2014 by Radha Alarcon RN  Perineal Care:   absorbent brief/pad changed   perineal hygiene encouraged   perineal spray bottle/warm water use encouraged   perineum cleansed  Goal: Nausea and Vomiting Relief  Outcome: Progressing  Goal: Effective Urinary Elimination  Outcome: Progressing  Goal: Effective Oxygenation and Ventilation  Outcome: Progressing  Intervention: Optimize Oxygenation and Ventilation  Recent Flowsheet Documentation  Taken 2/27/2025 2014 by Radha Alarcon RN  Head of Bed (HOB) Positioning: HOB elevated     Problem: Breastfeeding  Goal: Effective Breastfeeding  Outcome: Progressing    WDL, up ad mychal, voiding without difficulty, pain control with PO meds, working on breastfeeding.

## 2025-02-28 NOTE — PROGRESS NOTES
[facility]   Obstetrics and Gynecology     2025    Name:Suzanne Wade    MR#:[id number]     Progress Note:  Post-Op    HD:2    Subjective   34 y.o. yo Female  s/p rCS+BS at 39w1d doing well. Pain well controlled. Tolerating regular diet and having flatus. Lochia normal.  Voiding spontaneously.  Ambulating well.  Would like to stay another day.    Objective    Vitals  Temp:  Temp:  [97.6 °F (36.4 °C)-97.9 °F (36.6 °C)] 97.9 °F (36.6 °C)  Temp src: Oral  BP:  BP: (119-131)/(75-82) 128/75  Pulse:  Heart Rate:  [51-75] 51  RR:   Resp:  [16] 16  Weight: 72.6 kg (160 lb)  BMI:  Body mass index is 25.06 kg/m².    Physical Exam  Vitals and nursing note reviewed.   Constitutional:       General: She is not in acute distress.     Appearance: Normal appearance.   Cardiovascular:      Pulses: Normal pulses.   Pulmonary:      Effort: Pulmonary effort is normal.   Abdominal:      General: There is no distension.      Palpations: Abdomen is soft.      Tenderness: There is no abdominal tenderness. There is no guarding or rebound.      Comments: Incision c/d/i   Musculoskeletal:         General: No swelling or tenderness.      Right lower leg: No edema.      Left lower leg: No edema.   Neurological:      Mental Status: She is alert and oriented to person, place, and time.   Psychiatric:         Mood and Affect: Mood normal.         Behavior: Behavior normal.       I/O last 3 completed shifts:  In: -   Out: 2225 [Urine:2225]    LABS:  Results from last 7 days   Lab Units 25  0705 25  1034   WBC 10*3/mm3 8.50 7.83   HEMOGLOBIN g/dL 10.6* 12.6   HEMATOCRIT % 32.5* 37.9   PLATELETS 10*3/mm3 171 198           Infant: female      Assessment    1.  POD#2     History of     History of postpartum depression    Anemia due to acute blood loss    S/P  section    Plan:  Continue routine postoperative care     Ariane Hernandez MD  2025 10:58 EST

## 2025-02-28 NOTE — PLAN OF CARE
Goal Outcome Evaluation:  Plan of Care Reviewed With: patient, spouse        Progress: improving  Outcome Evaluation: vitals stable, assessment wdl, up ad mychal, abd binder on, eras meds given, breastfeeding

## 2025-03-01 VITALS
DIASTOLIC BLOOD PRESSURE: 71 MMHG | RESPIRATION RATE: 16 BRPM | HEART RATE: 61 BPM | BODY MASS INDEX: 25.11 KG/M2 | SYSTOLIC BLOOD PRESSURE: 121 MMHG | TEMPERATURE: 98.4 F | HEIGHT: 67 IN | WEIGHT: 160 LBS | OXYGEN SATURATION: 99 %

## 2025-03-01 RX ORDER — AMOXICILLIN 250 MG
1 CAPSULE ORAL 2 TIMES DAILY PRN
Qty: 60 TABLET | Refills: 1 | Status: SHIPPED | OUTPATIENT
Start: 2025-03-01

## 2025-03-01 RX ORDER — ACETAMINOPHEN 325 MG/1
650 TABLET ORAL EVERY 6 HOURS SCHEDULED
Qty: 60 TABLET | Refills: 1 | Status: SHIPPED | OUTPATIENT
Start: 2025-03-01

## 2025-03-01 RX ORDER — IBUPROFEN 600 MG/1
600 TABLET, FILM COATED ORAL EVERY 6 HOURS SCHEDULED
Qty: 60 TABLET | Refills: 1 | Status: SHIPPED | OUTPATIENT
Start: 2025-03-01

## 2025-03-01 RX ADMIN — DOCUSATE SODIUM 100 MG: 100 CAPSULE, LIQUID FILLED ORAL at 08:31

## 2025-03-01 RX ADMIN — SERTRALINE HYDROCHLORIDE 50 MG: 50 TABLET, FILM COATED ORAL at 08:31

## 2025-03-01 RX ADMIN — ACETAMINOPHEN 650 MG: 325 TABLET, FILM COATED ORAL at 02:20

## 2025-03-01 RX ADMIN — ACETAMINOPHEN 650 MG: 325 TABLET, FILM COATED ORAL at 08:31

## 2025-03-01 RX ADMIN — PRENATAL VITAMINS-IRON FUMARATE 27 MG IRON-FOLIC ACID 0.8 MG TABLET 1 TABLET: at 08:31

## 2025-03-01 RX ADMIN — IBUPROFEN 600 MG: 600 TABLET ORAL at 11:18

## 2025-03-01 RX ADMIN — SIMETHICONE CHEW TAB 80 MG 80 MG: 80 TABLET ORAL at 05:30

## 2025-03-01 RX ADMIN — IBUPROFEN 600 MG: 600 TABLET ORAL at 05:15

## 2025-03-01 RX ADMIN — FERROUS SULFATE TAB 325 MG (65 MG ELEMENTAL FE) 325 MG: 325 (65 FE) TAB at 08:31

## 2025-03-01 NOTE — PLAN OF CARE
Goal Outcome Evaluation:  Plan of Care Reviewed With: patient     VSS, pain controlled with ERAS protocol. Fundus and lochia WNL. Ambulating independently, voiding spontaneously. Incision open to air with steri strips. Breastfeeding well. No concerns at this time, expected discharge later today.      Progress: improving

## 2025-03-01 NOTE — DISCHARGE SUMMARY
[facility]   Obstetrics and Gynecology    Section Discharge Summary    Date of Admission: 2025  Date of Discharge:        Patient: Suzanne Wade      MR#:[id number]    Surgeon/OB: Jhon Palacios MD    Discharge Diagnosis:    section at 39w1d, uncomplicated recovery    History of     History of postpartum depression    Anemia due to acute blood loss    S/P  section        Procedures:  , Low Transverse    2025   1:04 PM     Anesthesia:  Spinal    Hospital Course  Patient is a 34 y.o. female  at 39w1d status post  section with uneventful postoperative recovery.  Patient was advanced to regular diet on postoperative day#1.  On discharge, ambulating, tolerating a regular diet without any difficulties and her incision is dry, clean and intact.     Infant:   female fetus 3450 g (7 lb 9.7 oz) with Apgar scores of 9 , 9  at five minutes.    Condition on Discharge:  Stable    Vital Signs  Temp:  [97.9 °F (36.6 °C)-98.4 °F (36.9 °C)] 98.4 °F (36.9 °C)  Heart Rate:  [55-61] 61  Resp:  [16] 16  BP: (112-121)/(71) 121/71    Results from last 7 days   Lab Units 25  0705 25  1034   WBC 10*3/mm3 8.50 7.83   HEMOGLOBIN g/dL 10.6* 12.6   HEMATOCRIT % 32.5* 37.9   PLATELETS 10*3/mm3 171 198             Discharge Disposition  Home or Self Care    Discharge Medications     Your medication list        START taking these medications        Instructions Last Dose Given Next Dose Due   ibuprofen 600 MG tablet  Commonly known as: ADVIL,MOTRIN      Take 1 tablet by mouth Every 6 (Six) Hours.       sennosides-docusate 8.6-50 MG per tablet  Commonly known as: PERICOLACE      Take 1 tablet by mouth 2 (Two) Times a Day As Needed for Constipation.              CHANGE how you take these medications        Instructions Last Dose Given Next Dose Due   acetaminophen 325 MG tablet  Commonly known as: TYLENOL  What changed: You were already taking a  medication with the same name, and this prescription was added. Make sure you understand how and when to take each.      Take 2 tablets by mouth Every 6 (Six) Hours.       acetaminophen 500 MG tablet  Commonly known as: TYLENOL  What changed: Another medication with the same name was added. Make sure you understand how and when to take each.      Take 2 tablets by mouth Every 6 (Six) Hours As Needed for Mild Pain.              CONTINUE taking these medications        Instructions Last Dose Given Next Dose Due   clotrimazole-betamethasone 1-0.05 % cream  Commonly known as: LOTRISONE      Apply 1 Application topically to the appropriate area as directed 2 (Two) Times a Day. Apply to affected area twice daily 7 days       ferrous sulfate 325 (65 FE) MG tablet      Take 1 tablet by mouth Daily With Breakfast.       prenatal (CLASSIC) vitamin 28-0.8 MG tablet tablet  Generic drug: prenatal vitamin      Take  by mouth Daily.                 Where to Get Your Medications        These medications were sent to [facility] [place] [id number]      Hours: Monday to Friday 7 AM to 6 PM, Saturday & Sunday 8 AM to 4:30 PM (Closed 12 PM to 12:30 PM) Phone: [phone]   acetaminophen 325 MG tablet  ibuprofen 600 MG tablet  sennosides-docusate 8.6-50 MG per tablet           Discharge Diet: Regular    Follow-up Appointments  Future Appointments   Date Time Provider Department Center   3/12/2025 11:00 AM Jhon Palacios MD [facility]  [facility]     Additional Instructions for the Follow-ups that You Need to Schedule       Call MD for problems / concerns.   As directed      Call for any problems with  1.  Heavy vaginal bleeding (greater than 2 pads an hours for 2 hours)  2.  Fever above 101.3  3.  Incisional redness  4.  Signs of Preeclampsia:  headache, visual changes or elevated blood pressure    Order Comments: Call for any problems with 1.  Heavy vaginal bleeding (greater than 2 pads an hours  for 2 hours) 2.  Fever above 101.3 3.  Incisional redness 4.  Signs of Preeclampsia:  headache, visual changes or elevated blood pressure                 Prenatal labs/vax:   Immunization History   Administered Date(s) Administered    Fluzone  >6mos 09/17/2024    Fluzone (or Fluarix & Flulaval for VFC) >6mos 10/05/2022    Influenza, Unspecified 10/01/2021, 10/05/2022    Tdap 12/11/2019, 10/05/2022, 12/11/2024       External Prenatal Results       Pregnancy Outside Results - Transcribed From Office Records - See Scanned Records For Details       Test Value Date Time    ABO  B  02/26/25 1034    Rh  Positive  02/26/25 1034    Antibody Screen  Negative  02/26/25 1034       Negative  07/23/24 1457    Varicella IgG  Reactive  11/08/24 1138    Rubella  <0.90 index 11/08/24 1138       1.02 index 07/23/24 1457    Hgb  10.6 g/dL 02/27/25 0705       12.6 g/dL 02/26/25 1034       10.8 g/dL 12/11/24 1233       12.4 g/dL 07/23/24 1457    Hct  32.5 % 02/27/25 0705       37.9 % 02/26/25 1034       36.3 % 07/23/24 1457    HgB A1c        1h GTT  67 mg/dL 12/11/24 1233    3h GTT Fasting       3h GTT 1 hour       3h GTT 2 hour       3h GTT 3 hour        Gonorrhea (discrete)       Chlamydia (discrete)       RPR  Non Reactive  12/11/24 1233       Non Reactive  07/23/24 1457    Syphils cascade: TP-Ab (FTA)  Non-Reactive  02/26/25 1034    TP-Ab  Non-Reactive  02/26/25 1034    TP-Ab (EIA)       TPPA       HBsAg  Negative  07/23/24 1457    Herpes Simplex Virus PCR       Herpes Simplex VIrus Culture       HIV  Non Reactive  07/23/24 1457    Hep C RNA Quant PCR       Hep C Antibody       AFP       NIPT       Cystic Fibrosis (Selin)       Cystic Fibroisis        Spinal Muscular atrophy       Fragile X       Group B Strep  Negative  02/04/25 1349    GBS Susceptibility to Clindamycin       GBS Susceptibility to Erythromycin       Fetal Fibronectin       Genetic Testing, Maternal Blood                 Drug Screening       Test Value Date Time     Urine Drug Screen       Amphetamine Screen       Barbiturate Screen       Benzodiazepine Screen       Methadone Screen       Phencyclidine Screen       Opiates Screen       THC Screen       Cocaine Screen       Propoxyphene Screen       Buprenorphine Screen       Methamphetamine Screen       Oxycodone Screen       Tricyclic Antidepressants Screen                 Legend    ^: Historical                              Addie Bangura MD  3/1/2025  10:10 EST

## 2025-03-02 ENCOUNTER — MATERNAL SCREENING (OUTPATIENT)
Dept: CALL CENTER | Facility: HOSPITAL | Age: 35
End: 2025-03-02
Payer: COMMERCIAL

## 2025-03-02 NOTE — OUTREACH NOTE
Maternal Screening Survey      Flowsheet Row Responses   Eligibility Eligible   Prep survey completed? Yes   Facility patient discharged from? [facility] - Registered Nurse

## 2025-03-12 ENCOUNTER — MATERNAL SCREENING (OUTPATIENT)
Dept: CALL CENTER | Facility: HOSPITAL | Age: 35
End: 2025-03-12
Payer: COMMERCIAL

## 2025-03-12 ENCOUNTER — POSTPARTUM VISIT (OUTPATIENT)
Dept: OBSTETRICS AND GYNECOLOGY | Age: 35
End: 2025-03-12
Payer: COMMERCIAL

## 2025-03-12 VITALS
BODY MASS INDEX: 22.29 KG/M2 | SYSTOLIC BLOOD PRESSURE: 120 MMHG | WEIGHT: 142 LBS | DIASTOLIC BLOOD PRESSURE: 86 MMHG | HEIGHT: 67 IN

## 2025-03-12 DIAGNOSIS — Z98.890 POST-OPERATIVE STATE: Primary | ICD-10-CM

## 2025-03-12 PROCEDURE — 99024 POSTOP FOLLOW-UP VISIT: CPT | Performed by: OBSTETRICS & GYNECOLOGY

## 2025-03-12 NOTE — PROGRESS NOTES
Return for shortness of breath   Subjective       History of Present Illness  Suzanne Wade is a 34 y.o. female is being seen today for 2-week incision check following repeat  with bilateral salpingectomy  Both patient and her daughter are doing well no concerns or complaints  Chief Complaint   Patient presents with    Postpartum Care     Cc: 2 wk's Postpartum visit today C section 25 , baby girl Danii  , patient is breastfeeding    .        The following portions of the patient's history were reviewed and updated as appropriate: allergies, current medications, past family history, past medical history, past social history, past surgical history and problem list.    PAST MEDICAL HISTORY  No past medical history on file.  OB History    Para Term  AB Living   4 3 3  1 3   SAB IAB Ectopic Molar Multiple Live Births   1    0 3      # Outcome Date GA Lbr Gabe/2nd Weight Sex Type Anes PTL Lv   4 Term 25 39w1d  3450 g (7 lb 9.7 oz) F CS-LTranv Spinal N RAYMUNDO      Birth Comments: panda or 3   3 Term 22 39w0d  3470 g (7 lb 10.4 oz) M CS-LTranv Spinal N RAYMUNDO      Birth Comments: Panda in OR1   2 SAB     U SAB   FD   1 Term 19 39w0d  3145 g (6 lb 14.9 oz) F CS-LTranv Spinal N RAYMUNDO     Past Surgical History:   Procedure Laterality Date     SECTION N/A 3/22/2019    Procedure:  SECTION PRIMARY;  Surgeon: Macey Hayden MD;  Location: [facility] LABOR DELIVERY;  Service: Obstetrics/Gynecology     SECTION N/A 2022    Procedure:  SECTION REPEAT;  Surgeon: Jhon Palacios MD;  Location: [facility] LABOR DELIVERY;  Service: Obstetrics/Gynecology;  Laterality: N/A;     SECTION N/A 2025    Procedure:  SECTION REPEAT WITH SALPINGECTOMY;  Surgeon: Jhon Palacios MD;  Location: [facility] LABOR DELIVERY;  Service: Obstetrics/Gynecology;  Laterality: N/A;    SKIN SURGERY      birth escobar removed     WISDOM TOOTH EXTRACTION       No family history on file.  Social History      Tobacco Use   Smoking Status Never    Passive exposure: Never   Smokeless Tobacco Never       Current Outpatient Medications:     ferrous sulfate 325 (65 FE) MG tablet, Take 1 tablet by mouth Daily With Breakfast., Disp: 30 tablet, Rfl: 11    prenatal vitamin (prenatal, CLASSIC, vitamin) tablet, Take  by mouth Daily., Disp: , Rfl:     sennosides-docusate (senna-docusate sodium) 8.6-50 MG per tablet, Take 1 tablet by mouth 2 (Two) Times a Day As Needed for Constipation., Disp: 60 tablet, Rfl: 1    sertraline (Zoloft) 50 MG tablet, Take 1 tablet by mouth Daily., Disp: 90 tablet, Rfl: 3    acetaminophen (TYLENOL) 325 MG tablet, Take 2 tablets by mouth Every 6 (Six) Hours. (Patient not taking: Reported on 3/12/2025), Disp: 60 tablet, Rfl: 1    acetaminophen (TYLENOL) 500 MG tablet, Take 2 tablets by mouth Every 6 (Six) Hours As Needed for Mild Pain. (Patient not taking: Reported on 3/12/2025), Disp: , Rfl:     clotrimazole-betamethasone (LOTRISONE) 1-0.05 % cream, Apply 1 Application topically to the appropriate area as directed 2 (Two) Times a Day. Apply to affected area twice daily 7 days (Patient not taking: Reported on 3/12/2025), Disp: 45 g, Rfl: 1    ibuprofen (ADVIL,MOTRIN) 600 MG tablet, Take 1 tablet by mouth Every 6 (Six) Hours. (Patient not taking: Reported on 3/12/2025), Disp: 60 tablet, Rfl: 1  Immunization History   Administered Date(s) Administered    Fluzone  >6mos 09/17/2024    Fluzone (or Fluarix & Flulaval for VFC) >6mos 10/05/2022    Influenza, Unspecified 10/01/2021, 10/05/2022    Tdap 12/11/2019, 10/05/2022, 12/11/2024       Review of Systems       Except as outlined in history of physical illness, patient denies any changes in her GYN, , GI systems. All other systems reviewed are negative.    Objective   Physical Exam   Alert and oriented, respirations unlabored, heart regular rate and rhythm   Pelvic deferred  Abdomen is soft non tender  Uterus feels mathieu down nicely  Steri-Strips  were removed incision is healing very well clean dry and intact      Assessment & Plan   Diagnoses and all orders for this visit:    1. Post-operative state (Primary)    Status post repeat low-transverse  section with bilateral salpingectomy   2-week incision check, normal exam her daughter is doing well activity limitations restrictions reviewed  Return in 4 weeks for postpartum visit call with any problems questions concerns             No orders of the defined types were placed in this encounter.          EMR Dragon/ Transcription disclaimer:  Much of the encounter note is an electronic transcription/translation of spoken language to printed text. The electronic translation of spoken language may permit erroneous, or at times, nonessential words or phrases to be inadvertently transcribes; Although i have reviewed the note for such errors, some may still exist.

## 2025-03-12 NOTE — OUTREACH NOTE
Maternal Screening Survey      Flowsheet Row Responses   [facility]   [facility]? Yes   Call start time 1019   Call end time 1022   Person spoke with today (if not patient) and relationship Patient   I have been able to laugh and see the funny side of things. 0   I have looked forward with enjoyment to things. 0   I have blamed myself unnecessarily when things went wrong. 1   I have been anxious or worried for no good reason. 0   I have felt scared or panicky for no good reason. 0   Things have been getting on top of me. 0   I have been so unhappy that I have had difficulty sleeping. 0   I have felt sad or miserable. 0   I have been so unhappy that I have been crying. 0   The thought of harming myself has occurred to me. 0   [place]  Depression Scale Total 1   Did any of your parents have problems with alcohol or drug use? No   Do any of your peers have problems with alcohol or drug use? No   Does your partner have problems with alcohol or drug use? No   Before you were pregnant did you have problems with alcohol or drug use? (past) No   In the past month, did you drink beer, wine, liquor or use any other drugs? (pregnancy) No   Maternal Screening call completed Yes   Call end time 1022              Chloe YOO - Registered Nurse              Chloe YOO - Registered Nurse

## 2025-03-21 ENCOUNTER — TELEPHONE (OUTPATIENT)
Dept: OBSTETRICS AND GYNECOLOGY | Age: 35
End: 2025-03-21
Payer: COMMERCIAL

## 2025-04-07 ENCOUNTER — POSTPARTUM VISIT (OUTPATIENT)
Dept: OBSTETRICS AND GYNECOLOGY | Age: 35
End: 2025-04-07
Payer: COMMERCIAL

## 2025-04-07 VITALS
SYSTOLIC BLOOD PRESSURE: 110 MMHG | WEIGHT: 146 LBS | BODY MASS INDEX: 22.91 KG/M2 | HEIGHT: 67 IN | DIASTOLIC BLOOD PRESSURE: 64 MMHG

## 2025-04-07 PROCEDURE — 0503F POSTPARTUM CARE VISIT: CPT | Performed by: OBSTETRICS & GYNECOLOGY

## 2025-04-07 NOTE — PROGRESS NOTES
"Subjective     Chief Complaint   Patient presents with    Post-op Follow-up     6 wk pp check       Baby's name: Danii Wade is a 35 y.o. female who presents for a postpartum visit. She is 6 weeks postpartum following a repeat low-transverse  section with PPS. I have fully reviewed the prenatal and intrapartum course. Postpartum course has been uneventful. Baby is feeding by breast. Bleeding has been normal in amount and decreasing.. Bowel function is normal. Bladder function is normal. Patient not sexually active at this time. Contraception method is discussed. Postpartum depression screening: negative.         Postpartum Depression: Low Risk  (3/12/2025)    [place]  Depression Scale     Last EPDS Total Score: 1     Last EPDS Self Harm Result: Never   Recent Concern: Postpartum Depression - Medium Risk (3/1/2025)    [place]  Depression Scale     Last EPDS Total Score: 5     Last EPDS Self Harm Result: Never       The following portions of the patient's history were reviewed and updated as appropriate: allergies, current medications, past family history, past medical history, past social history, past surgical history and problem list.    Review of Systems  Noted in HPI    Objective   /64   Ht 170.2 cm (67\")   Wt 66.2 kg (146 lb)   Breastfeeding Yes   BMI 22.87 kg/m²    General:  alert    Breasts:  normal       Heart:  regular rate and rhytm   Abdomen: Normal findings    Vulva:  normal   Vagina: normal   Cervix:  Normal with no cervical motion tenderness   Corpus: Normal for post partum visit   Adnexa:  Non tender, non enlarged         Diagnoses and all orders for this visit:    1. Postpartum follow-up (Primary)  -     IGP, Apt HPV,rfx 16 / 18,45       Normal postpartum exam. Pap smear done at today's visit.     Contraception: discussed   Slow return to normal activities reviewed. Continue prenatal vitamins.   Follow up in 12 months  Or sooner  as " needed.    Problem List Items Addressed This Visit    None  Visit Diagnoses         Postpartum follow-up    -  Primary    Relevant Orders    IGP, Apt HPV,rfx 16 / 18,45                 EMR Dragon/ Transcription disclaimer:  Much of the encounter note is an electronic transcription/translation of spoken language to printed text. The electronic translation of spoken language may permit erroneous, or at times, nonessential words or phrases to be inadvertently transcribes; Although i have reviewed the note for such errors, some may still exist.

## 2025-04-11 LAB
CYTOLOGIST CVX/VAG CYTO: NORMAL
CYTOLOGY CVX/VAG DOC CYTO: NORMAL
CYTOLOGY CVX/VAG DOC THIN PREP: NORMAL
DX ICD CODE: NORMAL
HPV I/H RISK 4 DNA CVX QL PROBE+SIG AMP: NEGATIVE
Lab: NORMAL
OTHER STN SPEC: NORMAL
SERVICE CMNT-IMP: NORMAL
STAT OF ADQ CVX/VAG CYTO-IMP: NORMAL

## (undated) DEVICE — PK C/SECT 10

## (undated) DEVICE — SUT SILK 2/0 TIES 18IN A185H

## (undated) DEVICE — ANTIBACTERIAL UNDYED BRAIDED (POLYGLACTIN 910), SYNTHETIC ABSORBABLE SUTURE: Brand: COATED VICRYL

## (undated) DEVICE — SUT GUT CHRM 1 CTX 36IN 905H

## (undated) DEVICE — 3M(TM) TEGADERM(TM) TRANSPARENT FILM DRESSING FRAME STYLE 1627: Brand: 3M™ TEGADERM™

## (undated) DEVICE — TRY SPINE BLCK WHITACRE 25G 3X5IN

## (undated) DEVICE — SUT GUT CHRM 3/0 SH 27IN G122H

## (undated) DEVICE — SOL IRR NACL 0.9PCT BT 1000ML

## (undated) DEVICE — ENSEAL X1 STRAIGHT 25CM SHAFT: Brand: HARMONIC

## (undated) DEVICE — SOL IRR H2O BO 1000ML STRL

## (undated) DEVICE — SUT VIC 2/0 CT1 27IN J339H BX/36

## (undated) DEVICE — SLV SCD CALF HEMOFORCE DVT THERP REPROC MD

## (undated) DEVICE — SOL IRR H2O BTL 1000ML STRL

## (undated) DEVICE — SUT PLAIN  3/0 CT1 27IN 842H

## (undated) DEVICE — GLV SURG BIOGEL LTX PF 6 1/2

## (undated) DEVICE — MAT PREVALON MOBL TRANSFR AIR WO/PAD 39X80IN

## (undated) DEVICE — SUT GUT CHRM 0 CT 27IN 914H

## (undated) DEVICE — PROXIMATE RH ROTATING HEAD SKIN STAPLERS (35 WIDE) CONTAINS 35 STAINLESS STEEL STAPLES: Brand: PROXIMATE

## (undated) DEVICE — SUT MNCRYL 0/0 CTX 36IN Y398H

## (undated) DEVICE — GLV SURG BIOGEL LTX PF 8

## (undated) DEVICE — COATED VICRYL  (POLYGLACTIN 910) SUTURE, VIOLET BRAIDED, STERILE, SYNTHETIC ABSORBABLE SUTURE: Brand: COATED VICRYL